# Patient Record
Sex: FEMALE | Race: WHITE | NOT HISPANIC OR LATINO | Employment: OTHER | URBAN - METROPOLITAN AREA
[De-identification: names, ages, dates, MRNs, and addresses within clinical notes are randomized per-mention and may not be internally consistent; named-entity substitution may affect disease eponyms.]

---

## 2017-07-18 ENCOUNTER — HOSPITAL ENCOUNTER (OUTPATIENT)
Dept: RADIOLOGY | Facility: HOSPITAL | Age: 77
Discharge: HOME | End: 2017-07-18
Attending: ORTHOPAEDIC SURGERY
Payer: MEDICARE

## 2017-07-18 ENCOUNTER — OFFICE VISIT (OUTPATIENT)
Dept: ORTHOPEDICS | Facility: CLINIC | Age: 77
End: 2017-07-18
Payer: MEDICARE

## 2017-07-18 VITALS
BODY MASS INDEX: 24.73 KG/M2 | HEIGHT: 61 IN | HEART RATE: 50 BPM | DIASTOLIC BLOOD PRESSURE: 60 MMHG | WEIGHT: 131 LBS | SYSTOLIC BLOOD PRESSURE: 126 MMHG

## 2017-07-18 DIAGNOSIS — M25.532 LEFT WRIST PAIN: Primary | ICD-10-CM

## 2017-07-18 DIAGNOSIS — S52.502A CLOSED FRACTURE OF DISTAL END OF LEFT RADIUS, UNSPECIFIED FRACTURE MORPHOLOGY, INITIAL ENCOUNTER: Primary | ICD-10-CM

## 2017-07-18 DIAGNOSIS — M25.532 LEFT WRIST PAIN: ICD-10-CM

## 2017-07-18 PROCEDURE — 99999 PR PBB SHADOW E&M-NEW PATIENT-LVL III: CPT | Mod: PBBFAC,,, | Performed by: ORTHOPAEDIC SURGERY

## 2017-07-18 PROCEDURE — 1125F AMNT PAIN NOTED PAIN PRSNT: CPT | Mod: ,,, | Performed by: ORTHOPAEDIC SURGERY

## 2017-07-18 PROCEDURE — 73110 X-RAY EXAM OF WRIST: CPT | Mod: TC,PN,LT

## 2017-07-18 PROCEDURE — 1159F MED LIST DOCD IN RCRD: CPT | Mod: ,,, | Performed by: ORTHOPAEDIC SURGERY

## 2017-07-18 PROCEDURE — 73110 X-RAY EXAM OF WRIST: CPT | Mod: 26,LT,, | Performed by: RADIOLOGY

## 2017-07-18 PROCEDURE — 99203 OFFICE O/P NEW LOW 30 MIN: CPT | Mod: S$PBB,,, | Performed by: ORTHOPAEDIC SURGERY

## 2017-07-18 RX ORDER — NICOTINE POLACRILEX 2 MG
10000 GUM BUCCAL
COMMUNITY

## 2017-07-18 RX ORDER — CHOLECALCIFEROL (VITAMIN D3) 25 MCG
5000 TABLET ORAL
COMMUNITY

## 2017-07-18 RX ORDER — HYDROCODONE BITARTRATE AND ACETAMINOPHEN 10; 325 MG/1; MG/1
TABLET ORAL
Status: ON HOLD | COMMUNITY
End: 2023-09-04 | Stop reason: HOSPADM

## 2017-07-18 RX ORDER — PSEUDOEPHEDRINE HCL 30 MG
30 TABLET ORAL EVERY 4 HOURS PRN
Status: ON HOLD | COMMUNITY
End: 2023-09-04 | Stop reason: HOSPADM

## 2017-07-18 RX ORDER — OXYBUTYNIN CHLORIDE 5 MG/1
5 TABLET ORAL
COMMUNITY

## 2017-07-18 RX ORDER — VENLAFAXINE HYDROCHLORIDE 75 MG/1
75 CAPSULE, EXTENDED RELEASE ORAL DAILY
COMMUNITY

## 2017-07-18 RX ORDER — IBUPROFEN 200 MG
1500 CAPSULE ORAL
Status: ON HOLD | COMMUNITY
End: 2023-09-04 | Stop reason: HOSPADM

## 2017-07-18 RX ORDER — TRAMADOL HYDROCHLORIDE 50 MG/1
50 TABLET ORAL EVERY 6 HOURS PRN
COMMUNITY

## 2017-07-18 NOTE — PROGRESS NOTES
History reviewed. No pertinent past medical history.    History reviewed. No pertinent surgical history.    Current Outpatient Prescriptions   Medication Sig    biotin 1 mg Cap Take 10,000 mcg by mouth.    calcium carbonate-vitamin D3 500 mg(1,250mg) -125 unit per tablet Take 1,500 mg by mouth.    hydrocodone-acetaminophen 10-325mg (NORCO)  mg Tab Take by mouth.    oxybutynin (DITROPAN) 5 MG Tab Take 5 mg by mouth.    pseudoephedrine (SUDAFED) 30 MG tablet Take 30 mg by mouth every 4 (four) hours as needed for Congestion.    tramadol (ULTRAM) 50 mg tablet Take 50 mg by mouth every 6 (six) hours as needed for Pain.    venlafaxine (EFFEXOR-XR) 75 MG 24 hr capsule Take 75 mg by mouth once daily.    vitamin D 1000 units Tab Take 5,000 Units by mouth.     No current facility-administered medications for this visit.        Review of patient's allergies indicates:   Allergen Reactions    Metrizamide Hives    Morphine Other (See Comments)     Took two days to recover    Oxycodone-acetaminophen Other (See Comments)     CONSTIPATION       History reviewed. No pertinent family history.    Social History     Social History    Marital status:      Spouse name: N/A    Number of children: N/A    Years of education: N/A     Occupational History    Not on file.     Social History Main Topics    Smoking status: Never Smoker    Smokeless tobacco: Never Used    Alcohol use No    Drug use: No    Sexual activity: Not on file     Other Topics Concern    Not on file     Social History Narrative    No narrative on file       Chief Complaint:   Chief Complaint   Patient presents with    Left Wrist - Injury       Consulting Physician: Self, Aaareferral    History of present illness:    This is a 76 y.o. year old female who complains of a first pain following a fall on 7/14/17.  She was seen in the emergency room where she was splinted.  She puts her pain today at a 6 out of 10.  She is  "right-handed.    Review of Systems:    Constitution: Denies chills, fever, and sweats.  HENT: Denies headaches or blurry vision.  Cardiovascular: Denies chest pain or irregular heart beat.  Respiratory: Denies cough or shortness of breath.  Gastrointestinal: Denies abdominal pain, nausea, or vomiting.  Musculoskeletal:  Denies muscle cramps.  Neurological: Denies dizziness or focal weakness.  Psychiatric/Behavioral: Normal mental status.  Hematologic/Lymphatic: Denies bleeding problem or easy bruising/bleeding.  Skin: Denies rash or suspicious lesions.    Examination:    Vital Signs:    Vitals:    07/18/17 0947   BP: 126/60   Pulse: (!) 50   Weight: 59.4 kg (131 lb)   Height: 5' 1" (1.549 m)   PainSc:   6   PainLoc: Wrist       Body mass index is 24.75 kg/m².    This a well-developed, well nourished patient in no acute distress.    Alert and oriented and cooperative to examination.       Physical Exam: Left Wrist Exam    Skin  Scars:   None  Rash:   None    Inspection  Erythema:  None  Bruising:  Diffuse  Swelling:  Moderate  Masses  None  Lymphadenopathy: None    Coordination:  Normal  Instability:  Not tested due to fracture    Range of Motion: Not tested due to fracture    Strength:  Not tested due to fracture    Tenderness:  Diffuse    Pulse:   2+ radial  Sensation:  Intact          Imaging: X-rays ordered and reviewed today of the left foot show a minimally displaced left distal radius fracture with acceptable alignment.        Assessment: Closed fracture of distal end of left radius, unspecified fracture morphology, initial encounter        Plan:  We'll place her into a short arm cast today.  I like to see him back in 1 week for in cast x-ray check.      DISCLAIMER: This note may have been dictated using voice recognition software and may contain grammatical errors.     NOTE: Consult report sent to referring provider via EPIC EMR.  "

## 2017-07-24 DIAGNOSIS — S52.502A CLOSED FRACTURE OF DISTAL END OF LEFT RADIUS, UNSPECIFIED FRACTURE MORPHOLOGY, INITIAL ENCOUNTER: Primary | ICD-10-CM

## 2017-07-25 ENCOUNTER — OFFICE VISIT (OUTPATIENT)
Dept: ORTHOPEDICS | Facility: CLINIC | Age: 77
End: 2017-07-25
Payer: MEDICARE

## 2017-07-25 ENCOUNTER — HOSPITAL ENCOUNTER (OUTPATIENT)
Dept: RADIOLOGY | Facility: HOSPITAL | Age: 77
Discharge: HOME | End: 2017-07-25
Attending: ORTHOPAEDIC SURGERY
Payer: MEDICARE

## 2017-07-25 VITALS
HEART RATE: 68 BPM | HEIGHT: 61 IN | SYSTOLIC BLOOD PRESSURE: 113 MMHG | WEIGHT: 130.94 LBS | BODY MASS INDEX: 24.72 KG/M2 | DIASTOLIC BLOOD PRESSURE: 56 MMHG

## 2017-07-25 DIAGNOSIS — S52.502D CLOSED FRACTURE OF DISTAL END OF LEFT RADIUS WITH ROUTINE HEALING, UNSPECIFIED FRACTURE MORPHOLOGY, SUBSEQUENT ENCOUNTER: Primary | ICD-10-CM

## 2017-07-25 DIAGNOSIS — S52.502A CLOSED FRACTURE OF DISTAL END OF LEFT RADIUS, UNSPECIFIED FRACTURE MORPHOLOGY, INITIAL ENCOUNTER: ICD-10-CM

## 2017-07-25 PROCEDURE — 73110 X-RAY EXAM OF WRIST: CPT | Mod: 26,LT,, | Performed by: RADIOLOGY

## 2017-07-25 PROCEDURE — 99213 OFFICE O/P EST LOW 20 MIN: CPT | Mod: S$PBB,,, | Performed by: ORTHOPAEDIC SURGERY

## 2017-07-25 PROCEDURE — 1159F MED LIST DOCD IN RCRD: CPT | Mod: ,,, | Performed by: ORTHOPAEDIC SURGERY

## 2017-07-25 PROCEDURE — 1125F AMNT PAIN NOTED PAIN PRSNT: CPT | Mod: ,,, | Performed by: ORTHOPAEDIC SURGERY

## 2017-07-25 PROCEDURE — 99999 PR PBB SHADOW E&M-EST. PATIENT-LVL III: CPT | Mod: PBBFAC,,, | Performed by: ORTHOPAEDIC SURGERY

## 2017-07-25 PROCEDURE — 73110 X-RAY EXAM OF WRIST: CPT | Mod: TC,PN,LT

## 2017-07-25 NOTE — PROGRESS NOTES
History reviewed. No pertinent past medical history.    History reviewed. No pertinent surgical history.    Current Outpatient Prescriptions   Medication Sig    biotin 1 mg Cap Take 10,000 mcg by mouth.    calcium carbonate-vitamin D3 500 mg(1,250mg) -125 unit per tablet Take 1,500 mg by mouth.    hydrocodone-acetaminophen 10-325mg (NORCO)  mg Tab Take by mouth.    oxybutynin (DITROPAN) 5 MG Tab Take 5 mg by mouth.    pseudoephedrine (SUDAFED) 30 MG tablet Take 30 mg by mouth every 4 (four) hours as needed for Congestion.    tramadol (ULTRAM) 50 mg tablet Take 50 mg by mouth every 6 (six) hours as needed for Pain.    venlafaxine (EFFEXOR-XR) 75 MG 24 hr capsule Take 75 mg by mouth once daily.    vitamin D 1000 units Tab Take 5,000 Units by mouth.     No current facility-administered medications for this visit.        Review of patient's allergies indicates:   Allergen Reactions    Metrizamide Hives    Morphine Other (See Comments)     Took two days to recover    Oxycodone-acetaminophen Other (See Comments)     CONSTIPATION       History reviewed. No pertinent family history.    Social History     Social History    Marital status:      Spouse name: N/A    Number of children: N/A    Years of education: N/A     Occupational History    Not on file.     Social History Main Topics    Smoking status: Never Smoker    Smokeless tobacco: Never Used    Alcohol use No    Drug use: No    Sexual activity: Not on file     Other Topics Concern    Not on file     Social History Narrative    No narrative on file       Chief Complaint:   Chief Complaint   Patient presents with    Left Wrist - Injury       Consulting Physician: No ref. provider found    History of present illness:    This is a 76 y.o. year old female who complains of wrist pain following a fall on 7/14/17. She puts her pain today at a 2 out of 10.    Review of Systems:    Constitution: Denies chills, fever, and sweats.  HENT: Denies  "headaches or blurry vision.  Cardiovascular: Denies chest pain or irregular heart beat.  Respiratory: Denies cough or shortness of breath.  Gastrointestinal: Denies abdominal pain, nausea, or vomiting.  Musculoskeletal:  Denies muscle cramps.  Neurological: Denies dizziness or focal weakness.  Psychiatric/Behavioral: Normal mental status.  Hematologic/Lymphatic: Denies bleeding problem or easy bruising/bleeding.  Skin: Denies rash or suspicious lesions.    Examination:    Vital Signs:    Vitals:    07/25/17 1042   BP: (!) 113/56   Pulse: 68   Weight: 59.4 kg (130 lb 15.3 oz)   Height: 5' 1" (1.549 m)   PainSc:   2   PainLoc: Wrist       Body mass index is 24.74 kg/m².    This a well-developed, well nourished patient in no acute distress.    Alert and oriented and cooperative to examination.       Physical Exam: Left Wrist Exam in cast    Skin  Scars:   None  Rash:   None    Inspection  Erythema:  None  Bruising:  none  Swelling:  minimal  Masses  None  Lymphadenopathy: None    Coordination:  Normal  Instability:  Not tested due to fracture    Range of Motion: Not tested due to fracture    Strength:  Not tested due to fracture    Tenderness:  Diffuse    Pulse:   2+ radial  Sensation:  Intact          Imaging: X-rays ordered and reviewed today of the left foot show a minimally displaced left distal radius fracture with acceptable alignment.        Assessment: Closed fracture of distal end of left radius with routine healing, unspecified fracture morphology, subsequent encounter        Plan:  We'll continue her short arm cast today.  I like to see her back in 4 weeks for out of cast x-ray check.      DISCLAIMER: This note may have been dictated using voice recognition software and may contain grammatical errors.     NOTE: Consult report sent to referring provider via EPIC EMR.  "

## 2017-08-17 DIAGNOSIS — S52.502D CLOSED FRACTURE OF DISTAL END OF LEFT RADIUS WITH ROUTINE HEALING, UNSPECIFIED FRACTURE MORPHOLOGY, SUBSEQUENT ENCOUNTER: Primary | ICD-10-CM

## 2017-08-21 ENCOUNTER — OFFICE VISIT (OUTPATIENT)
Dept: ORTHOPEDICS | Facility: CLINIC | Age: 77
End: 2017-08-21
Payer: MEDICARE

## 2017-08-21 VITALS
SYSTOLIC BLOOD PRESSURE: 118 MMHG | HEIGHT: 61 IN | HEART RATE: 67 BPM | DIASTOLIC BLOOD PRESSURE: 70 MMHG | BODY MASS INDEX: 24.72 KG/M2 | WEIGHT: 130.94 LBS

## 2017-08-21 DIAGNOSIS — S52.502D CLOSED FRACTURE OF DISTAL END OF LEFT RADIUS WITH ROUTINE HEALING, UNSPECIFIED FRACTURE MORPHOLOGY, SUBSEQUENT ENCOUNTER: Primary | ICD-10-CM

## 2017-08-21 PROCEDURE — 1159F MED LIST DOCD IN RCRD: CPT | Mod: S$GLB,,, | Performed by: ORTHOPAEDIC SURGERY

## 2017-08-21 PROCEDURE — 99213 OFFICE O/P EST LOW 20 MIN: CPT | Mod: S$GLB,,, | Performed by: ORTHOPAEDIC SURGERY

## 2017-08-21 PROCEDURE — 1125F AMNT PAIN NOTED PAIN PRSNT: CPT | Mod: S$GLB,,, | Performed by: ORTHOPAEDIC SURGERY

## 2017-08-21 NOTE — PROGRESS NOTES
History reviewed. No pertinent past medical history.    History reviewed. No pertinent surgical history.    Current Outpatient Prescriptions   Medication Sig    biotin 1 mg Cap Take 10,000 mcg by mouth.    calcium carbonate-vitamin D3 500 mg(1,250mg) -125 unit per tablet Take 1,500 mg by mouth.    hydrocodone-acetaminophen 10-325mg (NORCO)  mg Tab Take by mouth.    oxybutynin (DITROPAN) 5 MG Tab Take 5 mg by mouth.    pseudoephedrine (SUDAFED) 30 MG tablet Take 30 mg by mouth every 4 (four) hours as needed for Congestion.    tramadol (ULTRAM) 50 mg tablet Take 50 mg by mouth every 6 (six) hours as needed for Pain.    venlafaxine (EFFEXOR-XR) 75 MG 24 hr capsule Take 75 mg by mouth once daily.    vitamin D 1000 units Tab Take 5,000 Units by mouth.     No current facility-administered medications for this visit.        Review of patient's allergies indicates:   Allergen Reactions    Metrizamide Hives    Morphine Other (See Comments)     Took two days to recover    Oxycodone-acetaminophen Other (See Comments)     CONSTIPATION       History reviewed. No pertinent family history.    Social History     Social History    Marital status:      Spouse name: N/A    Number of children: N/A    Years of education: N/A     Occupational History    Not on file.     Social History Main Topics    Smoking status: Never Smoker    Smokeless tobacco: Never Used    Alcohol use No    Drug use: No    Sexual activity: Not on file     Other Topics Concern    Not on file     Social History Narrative    No narrative on file       Chief Complaint:   Chief Complaint   Patient presents with    Wrist Injury     closed fx of distal end of LEFT radius 7/14/17       Consulting Physician: No ref. provider found    History of present illness:    This is a 76 y.o. year old female who complains of wrist pain following a fall on 7/14/17. She puts her pain today at a 1 out of 10.    Review of Systems:    Constitution:  "Denies chills, fever, and sweats.  HENT: Denies headaches or blurry vision.  Cardiovascular: Denies chest pain or irregular heart beat.  Respiratory: Denies cough or shortness of breath.  Gastrointestinal: Denies abdominal pain, nausea, or vomiting.  Musculoskeletal:  Denies muscle cramps.  Neurological: Denies dizziness or focal weakness.  Psychiatric/Behavioral: Normal mental status.  Hematologic/Lymphatic: Denies bleeding problem or easy bruising/bleeding.  Skin: Denies rash or suspicious lesions.    Examination:    Vital Signs:    Vitals:    08/21/17 1034   BP: 118/70   Pulse: 67   Weight: 59.4 kg (130 lb 15.3 oz)   Height: 5' 1" (1.549 m)   PainSc:   1   PainLoc: Wrist       Body mass index is 24.74 kg/m².    This a well-developed, well nourished patient in no acute distress.    Alert and oriented and cooperative to examination.       Physical Exam: Left Wrist Exam in cast    Skin  Scars:   None  Rash:   None    Inspection  Erythema:  None  Bruising:  none  Swelling:  none  Masses  None  Lymphadenopathy: None    Coordination:  Normal  Instability:  Not tested due to fracture    Range of Motion: Near normal    Strength:  Not tested due to fracture    Tenderness:  none    Pulse:   2+ radial  Sensation:  Intact          Imaging: X-rays ordered and reviewed today of the left foot show a minimally displaced left distal radius fracture with acceptable alignment.        Assessment: Closed fracture of distal end of left radius with routine healing, unspecified fracture morphology, subsequent encounter        Plan: Switch her to a wrist immobilizer today.  We'll also get her set up for some physical therapy.  We gave her a home exercise program.  We'll see her back in 6 weeks' time.    DISCLAIMER: This note may have been dictated using voice recognition software and may contain grammatical errors.     NOTE: Consult report sent to referring provider via EPIC EMR.  "

## 2017-10-02 ENCOUNTER — OFFICE VISIT (OUTPATIENT)
Dept: ORTHOPEDICS | Facility: CLINIC | Age: 77
End: 2017-10-02
Payer: MEDICARE

## 2017-10-02 VITALS
SYSTOLIC BLOOD PRESSURE: 137 MMHG | BODY MASS INDEX: 24.72 KG/M2 | HEIGHT: 61 IN | DIASTOLIC BLOOD PRESSURE: 73 MMHG | HEART RATE: 60 BPM | WEIGHT: 130.94 LBS

## 2017-10-02 DIAGNOSIS — S52.502D CLOSED FRACTURE OF DISTAL END OF LEFT RADIUS WITH ROUTINE HEALING, UNSPECIFIED FRACTURE MORPHOLOGY, SUBSEQUENT ENCOUNTER: Primary | ICD-10-CM

## 2017-10-02 PROCEDURE — 99213 OFFICE O/P EST LOW 20 MIN: CPT | Mod: S$GLB,,, | Performed by: ORTHOPAEDIC SURGERY

## 2021-03-30 ENCOUNTER — TELEPHONE (OUTPATIENT)
Dept: OBSTETRICS AND GYNECOLOGY | Facility: CLINIC | Age: 81
End: 2021-03-30

## 2021-03-30 RX ORDER — OSPEMIFENE 60 MG/1
TABLET, FILM COATED ORAL
Qty: 90 TABLET | Refills: 0 | Status: SHIPPED | OUTPATIENT
Start: 2021-03-30 | End: 2021-06-02

## 2022-03-16 DIAGNOSIS — Z12.31 ENCOUNTER FOR SCREENING MAMMOGRAM FOR BREAST CANCER: Primary | ICD-10-CM

## 2022-03-18 ENCOUNTER — TELEPHONE (OUTPATIENT)
Dept: OBSTETRICS AND GYNECOLOGY | Facility: CLINIC | Age: 82
End: 2022-03-18
Payer: MEDICARE

## 2022-03-18 RX ORDER — FLUCONAZOLE 150 MG/1
TABLET ORAL
Qty: 2 TABLET | Refills: 0 | Status: ON HOLD | OUTPATIENT
Start: 2022-03-18 | End: 2023-09-04 | Stop reason: HOSPADM

## 2022-03-18 NOTE — TELEPHONE ENCOUNTER
----- Message from Rocio Metcalf LPN sent at 3/18/2022 11:52 AM CDT -----  Regarding: FW: fill today please called days ago  Contact: pt    ----- Message -----  From: Ameena Moreno  Sent: 3/18/2022  11:27 AM CDT  To: Beatrice Rajput I Staff  Subject: fill today please called days ago                Type:  RX Refill Request    Who Called:  the patient  Refill or New Rx:  refill  RX Name and Strength:  yeast infection (2 pills she said)  How is the patient currently taking it? (ex. 1XDay):  as order  Is this a 30 day or 90 day RX:  as order  Preferred Pharmacy with phone number:    [facility] - [place], [place] - [address]  [address]  [place] [address]  Phone: [phone] Fax: [phone]      Local or Mail Order:  local  Ordering Provider:  beatrice Mayberry Call Back Number:  [phone]      Additional Information:  pt called in a few days ago and sent to Dr. Lima, was not filled can we please get this filled today please, Thanks Delma

## 2022-04-14 ENCOUNTER — OFFICE VISIT (OUTPATIENT)
Dept: OBSTETRICS AND GYNECOLOGY | Facility: CLINIC | Age: 82
End: 2022-04-14
Payer: MEDICARE

## 2022-04-14 ENCOUNTER — HOSPITAL ENCOUNTER (OUTPATIENT)
Dept: RADIOLOGY | Facility: CLINIC | Age: 82
Discharge: HOME | End: 2022-04-14
Payer: MEDICARE

## 2022-04-14 VITALS
HEIGHT: 59 IN | SYSTOLIC BLOOD PRESSURE: 136 MMHG | WEIGHT: 146 LBS | BODY MASS INDEX: 29.43 KG/M2 | DIASTOLIC BLOOD PRESSURE: 70 MMHG

## 2022-04-14 DIAGNOSIS — R82.998 DARK URINE: Primary | ICD-10-CM

## 2022-04-14 DIAGNOSIS — Z12.31 ENCOUNTER FOR SCREENING MAMMOGRAM FOR BREAST CANCER: ICD-10-CM

## 2022-04-14 PROCEDURE — G0101 CA SCREEN;PELVIC/BREAST EXAM: HCPCS | Mod: S$GLB,,, | Performed by: OBSTETRICS & GYNECOLOGY

## 2022-04-14 PROCEDURE — 87086 URINE CULTURE/COLONY COUNT: CPT | Performed by: OBSTETRICS & GYNECOLOGY

## 2022-04-14 PROCEDURE — 77063 MAMMO DIGITAL SCREENING BILAT WITH TOMO: ICD-10-PCS | Mod: S$GLB,,, | Performed by: RADIOLOGY

## 2022-04-14 PROCEDURE — 77067 MAMMO DIGITAL SCREENING BILAT WITH TOMO: ICD-10-PCS | Mod: S$GLB,,, | Performed by: RADIOLOGY

## 2022-04-14 PROCEDURE — G0101 PR CA SCREEN;PELVIC/BREAST EXAM: ICD-10-PCS | Mod: S$GLB,,, | Performed by: OBSTETRICS & GYNECOLOGY

## 2022-04-14 PROCEDURE — 77063 BREAST TOMOSYNTHESIS BI: CPT | Mod: S$GLB,,, | Performed by: RADIOLOGY

## 2022-04-14 PROCEDURE — 77067 SCR MAMMO BI INCL CAD: CPT | Mod: S$GLB,,, | Performed by: RADIOLOGY

## 2022-04-14 NOTE — PROGRESS NOTES
"CC: Well woman exam    Mariela Solano is a 81 y.o. female No obstetric history on file. presents for well woman exam.  LMP: No LMP recorded. Patient is postmenopausal..   Patients last pap was over 10 years ago. .  Last wellness labs were done at Dr. Villa .  Last mammogram was 2022.  Last colonoscopy was   Primary care is Dr. Villa.  SBE: Yes No issues, problems, or complaints.  Patient complains of some cloudy urine but no other dysuria or frequency.  We will check urinalysis today.  Otherwise she is remaining healthy and has no problems and is due for mammogram today and wellness checkup.    Chief Complaint   Patient presents with    Well Woman     Patient is here for her well woman visit.         Past Medical History:   Diagnosis Date    Liver disease     Osteoarthritis      Past Surgical History:   Procedure Laterality Date    LUMBAR FUSION      TOTAL KNEE ARTHROPLASTY Bilateral     TUBAL LIGATION       Social History     Socioeconomic History    Marital status:    Tobacco Use    Smoking status: Never Smoker    Smokeless tobacco: Never Used   Substance and Sexual Activity    Alcohol use: No    Drug use: No    Sexual activity: Never     Birth control/protection: None     History reviewed. No pertinent family history.  OB History             Para        Term                AB        Living   3       SAB        IAB        Ectopic        Multiple        Live Births   3                 /70   Ht 4' 11" (1.499 m)   Wt 66.2 kg (146 lb)   BMI 29.49 kg/m²       ROS:  GENERAL: Denies weight gain or weight loss. Feeling well overall.   SKIN: Denies rash or lesions.   HEAD: Denies head injury or headache.   NODES: Denies enlarged lymph nodes.   CHEST: Denies chest pain or shortness of breath.   CARDIOVASCULAR: Denies palpitations or left sided chest pain.   ABDOMEN: No abdominal pain, constipation, diarrhea, nausea, vomiting or rectal bleeding.   URINARY: No " frequency, dysuria, hematuria, or burning on urination.  REPRODUCTIVE: See HPI.   BREASTS: The patient performs breast self-examination and denies pain, lumps, or nipple discharge.   HEMATOLOGIC: No easy bruisability or excessive bleeding.   MUSCULOSKELETAL: Denies joint pain or swelling.   NEUROLOGIC: Denies syncope or weakness.   PSYCHIATRIC: Denies depression, anxiety or mood swings.    PHYSICAL EXAM:  APPEARANCE: Well nourished, well developed, in no acute distress.  AFFECT: WNL, alert and oriented x 3  SKIN: No acne or hirsutism  NECK: Neck symmetric without masses or thyromegaly  NODES: No inguinal, cervical, or axillary lymph node enlargement  CHEST: Good respiratory effect  ABDOMEN: Soft.  No tenderness or masses.  No hepatosplenomegaly.  No hernias.  BREASTS: Symmetrical, no skin changes or visible lesions.  No palpable masses, nipple discharge bilaterally.  PELVIC:  Deferred   EXTREMITIES: No edema.    There are no diagnoses linked to this encounter.  Impression:  Normal wellness exam and breast screening.  Plan:  Yearly wellness and mammogram.      Patient was counseled today on A.C.S. Pap guidelines and recommendations for yearly pelvic exams, mammograms and monthly self breast exams; to see her PCP for other health maintenance.     No follow-ups on file.

## 2022-04-16 LAB — BACTERIA UR CULT: NO GROWTH

## 2022-05-16 ENCOUNTER — TELEPHONE (OUTPATIENT)
Dept: OBSTETRICS AND GYNECOLOGY | Facility: CLINIC | Age: 82
End: 2022-05-16
Payer: MEDICARE

## 2022-05-16 NOTE — TELEPHONE ENCOUNTER
Patient returned call let patient know that her result were normal and that she should receive a letter. Patient verbalized that she understood.

## 2023-01-11 ENCOUNTER — TELEPHONE (OUTPATIENT)
Dept: OBSTETRICS AND GYNECOLOGY | Facility: CLINIC | Age: 83
End: 2023-01-11
Payer: MEDICARE

## 2023-01-11 DIAGNOSIS — M81.8 OTHER OSTEOPOROSIS WITHOUT CURRENT PATHOLOGICAL FRACTURE: ICD-10-CM

## 2023-01-11 DIAGNOSIS — Z13.820 ENCOUNTER FOR SCREENING FOR OSTEOPOROSIS: Primary | ICD-10-CM

## 2023-01-11 NOTE — TELEPHONE ENCOUNTER
----- Message from Rocio Metcalf LPN sent at 1/11/2023 12:06 PM CST -----  Contact: PT  DEXA order      ----- Message -----  From: Ameena Moreno  Sent: 1/11/2023  11:55 AM CST  To: Beatrice Rajput I Staff    Pt is requesting orders to be put in/ SEND FAX    Orders Requested: BONE DENSITY  Reason for the orders: ANNUAL   Additional Information:     Please call pt to inform them the orders have been entered/SEND FAX so that they are able to call and schedule.     Call Back #: [phone]  Fax #:    Name of Company being Faxed: [facility]    PHONE [phone]  Thanks

## 2023-04-17 ENCOUNTER — TELEPHONE (OUTPATIENT)
Dept: OBSTETRICS AND GYNECOLOGY | Facility: CLINIC | Age: 83
End: 2023-04-17
Payer: MEDICARE

## 2023-04-17 DIAGNOSIS — Z12.31 ENCOUNTER FOR SCREENING MAMMOGRAM FOR BREAST CANCER: Primary | ICD-10-CM

## 2023-04-17 NOTE — TELEPHONE ENCOUNTER
----- Message from Angella Tolliver MA sent at 4/13/2023  3:25 PM CDT -----  Regarding: FW: mammo  Contact: patient  Requesting mammo  ----- Message -----  From: Mónica Santillan  Sent: 4/13/2023  12:30 PM CDT  To: Beatrice MOORE Staff  Subject: mammo                                            Type:  Mammogram    Caller is requesting to schedule their annual mammogram appointment.  Order is not listed in EPIC.  Please enter order and contact patient to schedule.    Name of Caller:  patient  Where would they like the mammogram performed?  Dr. Barron's Office  Best Call Back Number:  [phone] (home)     Additional Information: Patient is requesting an order for a mammogram patient would also like her bone density scheduled at Dr. Barron's office.  Please Call patient to advise.  Thanks!

## 2023-04-17 NOTE — TELEPHONE ENCOUNTER
Order was placed to be scheduled at [facility].  She can try to schedule around same time as BDS that is scheduled.

## 2023-04-20 ENCOUNTER — HOSPITAL ENCOUNTER (OUTPATIENT)
Dept: RADIOLOGY | Facility: HOSPITAL | Age: 83
Discharge: HOME | End: 2023-04-20
Attending: NURSE PRACTITIONER
Payer: MEDICARE

## 2023-04-20 DIAGNOSIS — M81.8 OTHER OSTEOPOROSIS WITHOUT CURRENT PATHOLOGICAL FRACTURE: ICD-10-CM

## 2023-04-20 DIAGNOSIS — Z13.820 ENCOUNTER FOR SCREENING FOR OSTEOPOROSIS: ICD-10-CM

## 2023-04-20 PROCEDURE — 77080 DXA BONE DENSITY AXIAL: CPT | Mod: TC

## 2023-04-20 PROCEDURE — 77080 DXA BONE DENSITY AXIAL: CPT | Mod: 26,,, | Performed by: RADIOLOGY

## 2023-04-20 PROCEDURE — 77080 DEXA BONE DENSITY SPINE HIP: ICD-10-PCS | Mod: 26,,, | Performed by: RADIOLOGY

## 2023-04-21 ENCOUNTER — TELEPHONE (OUTPATIENT)
Dept: OBSTETRICS AND GYNECOLOGY | Facility: CLINIC | Age: 83
End: 2023-04-21
Payer: MEDICARE

## 2023-04-21 NOTE — TELEPHONE ENCOUNTER
----- Message from Crys Barron NP sent at 4/21/2023  1:26 PM CDT -----  Worsening T score in hip.  Increase in fracture risk on FRAX score also.  I would recommend f/u with primary care for other treatment options or referral to osteoporosis clinic at [facility].

## 2023-04-21 NOTE — TELEPHONE ENCOUNTER
Spoke with pt and gave her, her results. Pt stated that she would like to follow up with her PCP. She stated that she is not in any pain and that she will follow up with her PCP in about 2months. Pt understood verbally understanding.

## 2023-05-04 ENCOUNTER — TELEPHONE (OUTPATIENT)
Dept: OBSTETRICS AND GYNECOLOGY | Facility: CLINIC | Age: 83
End: 2023-05-04
Payer: MEDICARE

## 2023-05-04 DIAGNOSIS — Z12.31 ENCOUNTER FOR SCREENING MAMMOGRAM FOR BREAST CANCER: Primary | ICD-10-CM

## 2023-05-04 NOTE — TELEPHONE ENCOUNTER
----- Message from Maddy Raman MA sent at 5/3/2023  4:22 PM CDT -----  Contact: Patient    ----- Message -----  From: Maddy Olsen  Sent: 5/3/2023   4:10 PM CDT  To: Beatrice MOORE Staff    Type:  Needs Medical Advice    Who Called: Patient       Would the patient rather a call back or a response via [facility]? Call    Best Call Back Number: [phone] ([place])     Additional Information: Patient is requesting orders for a mammo be sent to [facility]. Please call to advise

## 2023-07-05 ENCOUNTER — HOSPITAL ENCOUNTER (OUTPATIENT)
Dept: RADIOLOGY | Facility: HOSPITAL | Age: 83
Discharge: HOME | End: 2023-07-05
Attending: NURSE PRACTITIONER
Payer: MEDICARE

## 2023-07-05 DIAGNOSIS — Z12.31 ENCOUNTER FOR SCREENING MAMMOGRAM FOR BREAST CANCER: ICD-10-CM

## 2023-07-05 PROCEDURE — 77063 MAMMO DIGITAL SCREENING BILAT WITH TOMO: ICD-10-PCS | Mod: 26,,, | Performed by: RADIOLOGY

## 2023-07-05 PROCEDURE — 77063 BREAST TOMOSYNTHESIS BI: CPT | Mod: 26,,, | Performed by: RADIOLOGY

## 2023-07-05 PROCEDURE — 77067 SCR MAMMO BI INCL CAD: CPT | Mod: TC

## 2023-07-05 PROCEDURE — 77067 SCR MAMMO BI INCL CAD: CPT | Mod: 26,,, | Performed by: RADIOLOGY

## 2023-07-05 PROCEDURE — 77067 MAMMO DIGITAL SCREENING BILAT WITH TOMO: ICD-10-PCS | Mod: 26,,, | Performed by: RADIOLOGY

## 2023-08-28 ENCOUNTER — TELEPHONE (OUTPATIENT)
Dept: OBSTETRICS AND GYNECOLOGY | Facility: CLINIC | Age: 83
End: 2023-08-28
Payer: MEDICARE

## 2023-08-28 DIAGNOSIS — N95.2 ATROPHIC VAGINITIS: Primary | ICD-10-CM

## 2023-08-28 RX ORDER — OSPEMIFENE 60 MG/1
1 TABLET, FILM COATED ORAL DAILY
Qty: 90 TABLET | Refills: 3 | Status: SHIPPED | OUTPATIENT
Start: 2023-08-28 | End: 2023-12-12

## 2023-08-28 NOTE — TELEPHONE ENCOUNTER
----- Message from Mohamud Evans MA sent at 8/28/2023  2:52 PM CDT -----  Contact: pt    ----- Message -----  From: Deonte Frias  Sent: 8/28/2023  11:04 AM CDT  To: Beatrice Rajput I Staff    Type:  RX Refill Request    Who Called:  pt   Refill or New Rx:  refill  RX Name and Strength:  OSPHENA 60 mg Tab  How is the patient currently taking it? (ex. 1XDay):    Is this a 30 day or 90 day RX:  90  Preferred Pharmacy with phone number:        [facility] - [address] [address]  Phone: [phone] Fax: [phone]      Local or Mail Order:  mail  Ordering Provider:  DOM Barron  Best Call Back Number:  [phone]    Additional Information:  pt would like a refill. Please advise.

## 2023-08-30 ENCOUNTER — HOSPITAL ENCOUNTER (INPATIENT)
Facility: HOSPITAL | Age: 83
LOS: 4 days | Discharge: HOME | DRG: 605 | End: 2023-09-04
Attending: EMERGENCY MEDICINE | Admitting: FAMILY MEDICINE
Payer: MEDICARE

## 2023-08-30 DIAGNOSIS — S61.459A CAT BITE OF HAND: ICD-10-CM
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
## 2023-08-30 DIAGNOSIS — L03.113 CELLULITIS OF RIGHT HAND: Primary | ICD-10-CM

## 2023-08-30 DIAGNOSIS — R52 PAIN: ICD-10-CM

## 2023-08-30 DIAGNOSIS — L03.114 CELLULITIS OF HAND WITHOUT FINGER OR THUMB, LEFT: ICD-10-CM
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
## 2023-08-30 DIAGNOSIS — R07.9 CHEST PAIN: ICD-10-CM

## 2023-08-30 LAB
ALBUMIN SERPL BCP-MCNC: 3.3 G/DL (ref 3.5–5.2)
ALP SERPL-CCNC: 127 U/L (ref 55–135)
ALT SERPL W/O P-5'-P-CCNC: 14 U/L (ref 10–44)
ANION GAP SERPL CALC-SCNC: 11 MMOL/L (ref 8–16)
AST SERPL-CCNC: 23 U/L (ref 10–40)
BASOPHILS # BLD AUTO: 0.02 K/UL (ref 0–0.2)
BASOPHILS NFR BLD: 0.2 % (ref 0–1.9)
BILIRUB SERPL-MCNC: 1.1 MG/DL (ref 0.1–1)
BUN SERPL-MCNC: 26 MG/DL (ref 8–23)
CALCIUM SERPL-MCNC: 8.8 MG/DL (ref 8.7–10.5)
CHLORIDE SERPL-SCNC: 101 MMOL/L (ref 95–110)
CO2 SERPL-SCNC: 23 MMOL/L (ref 23–29)
CREAT SERPL-MCNC: 1 MG/DL (ref 0.5–1.4)
CRP SERPL-MCNC: 27.5 MG/L (ref 0–8.2)
DIFFERENTIAL METHOD: ABNORMAL
EOSINOPHIL # BLD AUTO: 0 K/UL (ref 0–0.5)
EOSINOPHIL NFR BLD: 0.4 % (ref 0–8)
ERYTHROCYTE [DISTWIDTH] IN BLOOD BY AUTOMATED COUNT: 12.4 % (ref 11.5–14.5)
EST. GFR  (NO RACE VARIABLE): 57 ML/MIN/1.73 M^2
GLUCOSE SERPL-MCNC: 150 MG/DL (ref 70–110)
HCT VFR BLD AUTO: 34.4 % (ref 37–48.5)
HGB BLD-MCNC: 11.3 G/DL (ref 12–16)
IMM GRANULOCYTES # BLD AUTO: 0.03 K/UL (ref 0–0.04)
IMM GRANULOCYTES NFR BLD AUTO: 0.3 % (ref 0–0.5)
LACTATE SERPL-SCNC: 0.8 MMOL/L (ref 0.5–2.2)
LYMPHOCYTES # BLD AUTO: 0.9 K/UL (ref 1–4.8)
LYMPHOCYTES NFR BLD: 9.4 % (ref 18–48)
MCH RBC QN AUTO: 31.2 PG (ref 27–31)
MCHC RBC AUTO-ENTMCNC: 32.8 G/DL (ref 32–36)
MCV RBC AUTO: 95 FL (ref 82–98)
MONOCYTES # BLD AUTO: 0.9 K/UL (ref 0.3–1)
MONOCYTES NFR BLD: 9.7 % (ref 4–15)
NEUTROPHILS # BLD AUTO: 7.3 K/UL (ref 1.8–7.7)
NEUTROPHILS NFR BLD: 80 % (ref 38–73)
NRBC BLD-RTO: 0 /100 WBC
PLATELET # BLD AUTO: 140 K/UL (ref 150–450)
PMV BLD AUTO: 12 FL (ref 9.2–12.9)
POTASSIUM SERPL-SCNC: 3.8 MMOL/L (ref 3.5–5.1)
PROT SERPL-MCNC: 6.5 G/DL (ref 6–8.4)
RBC # BLD AUTO: 3.62 M/UL (ref 4–5.4)
SODIUM SERPL-SCNC: 135 MMOL/L (ref 136–145)
WBC # BLD AUTO: 9.16 K/UL (ref 3.9–12.7)

## 2023-08-30 PROCEDURE — 73110 X-RAY EXAM OF WRIST: CPT | Mod: TC,RT

## 2023-08-30 PROCEDURE — 25000003 PHARM REV CODE 250: Performed by: FAMILY MEDICINE

## 2023-08-30 PROCEDURE — 25000003 PHARM REV CODE 250: Performed by: EMERGENCY MEDICINE

## 2023-08-30 PROCEDURE — 86140 C-REACTIVE PROTEIN: CPT | Performed by: EMERGENCY MEDICINE

## 2023-08-30 PROCEDURE — 90715 TDAP VACCINE 7 YRS/> IM: CPT | Performed by: EMERGENCY MEDICINE

## 2023-08-30 PROCEDURE — 96375 TX/PRO/DX INJ NEW DRUG ADDON: CPT

## 2023-08-30 PROCEDURE — 73110 X-RAY EXAM OF WRIST: CPT | Mod: 26,RT,, | Performed by: RADIOLOGY

## 2023-08-30 PROCEDURE — 63600175 PHARM REV CODE 636 W HCPCS: Performed by: FAMILY MEDICINE

## 2023-08-30 PROCEDURE — 85025 COMPLETE CBC W/AUTO DIFF WBC: CPT | Performed by: EMERGENCY MEDICINE

## 2023-08-30 PROCEDURE — 80053 COMPREHEN METABOLIC PANEL: CPT | Performed by: EMERGENCY MEDICINE

## 2023-08-30 PROCEDURE — 99223 1ST HOSP IP/OBS HIGH 75: CPT | Mod: AI,,, | Performed by: FAMILY MEDICINE

## 2023-08-30 PROCEDURE — 83605 ASSAY OF LACTIC ACID: CPT | Performed by: EMERGENCY MEDICINE

## 2023-08-30 PROCEDURE — 96365 THER/PROPH/DIAG IV INF INIT: CPT

## 2023-08-30 PROCEDURE — 73110 XR WRIST COMPLETE 3 VIEWS RIGHT: ICD-10-PCS | Mod: 26,RT,, | Performed by: RADIOLOGY

## 2023-08-30 PROCEDURE — 90471 IMMUNIZATION ADMIN: CPT | Performed by: EMERGENCY MEDICINE

## 2023-08-30 PROCEDURE — 96372 THER/PROPH/DIAG INJ SC/IM: CPT | Performed by: FAMILY MEDICINE

## 2023-08-30 PROCEDURE — 99223 PR INITIAL HOSPITAL CARE,LEVL III: ICD-10-PCS | Mod: AI,,, | Performed by: FAMILY MEDICINE

## 2023-08-30 PROCEDURE — G0378 HOSPITAL OBSERVATION PER HR: HCPCS

## 2023-08-30 PROCEDURE — 99285 EMERGENCY DEPT VISIT HI MDM: CPT

## 2023-08-30 PROCEDURE — 63600175 PHARM REV CODE 636 W HCPCS: Performed by: EMERGENCY MEDICINE

## 2023-08-30 PROCEDURE — 87040 BLOOD CULTURE FOR BACTERIA: CPT | Performed by: EMERGENCY MEDICINE

## 2023-08-30 RX ORDER — IBUPROFEN 200 MG
24 TABLET ORAL
Status: DISCONTINUED | OUTPATIENT
Start: 2023-08-30 | End: 2023-09-04 | Stop reason: HOSPADM

## 2023-08-30 RX ORDER — LANOLIN ALCOHOL/MO/W.PET/CERES
800 CREAM (GRAM) TOPICAL
Status: DISCONTINUED | OUTPATIENT
Start: 2023-08-30 | End: 2023-09-04 | Stop reason: HOSPADM

## 2023-08-30 RX ORDER — SODIUM,POTASSIUM PHOSPHATES 280-250MG
2 POWDER IN PACKET (EA) ORAL
Status: DISCONTINUED | OUTPATIENT
Start: 2023-08-30 | End: 2023-09-04 | Stop reason: HOSPADM

## 2023-08-30 RX ORDER — VENLAFAXINE HYDROCHLORIDE 37.5 MG/1
75 CAPSULE, EXTENDED RELEASE ORAL DAILY
Status: DISCONTINUED | OUTPATIENT
Start: 2023-08-30 | End: 2023-09-04 | Stop reason: HOSPADM

## 2023-08-30 RX ORDER — MELATONIN 10 MG
CAPSULE ORAL
COMMUNITY

## 2023-08-30 RX ORDER — OXYCODONE HYDROCHLORIDE 5 MG/1
5 TABLET ORAL EVERY 6 HOURS PRN
Status: DISCONTINUED | OUTPATIENT
Start: 2023-08-30 | End: 2023-09-04 | Stop reason: HOSPADM

## 2023-08-30 RX ORDER — IPRATROPIUM BROMIDE AND ALBUTEROL SULFATE 2.5; .5 MG/3ML; MG/3ML
3 SOLUTION RESPIRATORY (INHALATION) EVERY 6 HOURS PRN
Status: DISCONTINUED | OUTPATIENT
Start: 2023-08-30 | End: 2023-09-04 | Stop reason: HOSPADM

## 2023-08-30 RX ORDER — ENOXAPARIN SODIUM 100 MG/ML
40 INJECTION SUBCUTANEOUS EVERY 24 HOURS
Status: DISCONTINUED | OUTPATIENT
Start: 2023-08-30 | End: 2023-09-04 | Stop reason: HOSPADM

## 2023-08-30 RX ORDER — PROMETHAZINE HYDROCHLORIDE 12.5 MG/1
25 TABLET ORAL EVERY 6 HOURS PRN
Status: DISCONTINUED | OUTPATIENT
Start: 2023-08-30 | End: 2023-09-04 | Stop reason: HOSPADM

## 2023-08-30 RX ORDER — IBUPROFEN 200 MG
16 TABLET ORAL
Status: DISCONTINUED | OUTPATIENT
Start: 2023-08-30 | End: 2023-09-04 | Stop reason: HOSPADM

## 2023-08-30 RX ORDER — GLUCAGON 1 MG
1 KIT INJECTION
Status: DISCONTINUED | OUTPATIENT
Start: 2023-08-30 | End: 2023-09-04 | Stop reason: HOSPADM

## 2023-08-30 RX ORDER — HYDROCODONE BITARTRATE AND ACETAMINOPHEN 5; 325 MG/1; MG/1
1 TABLET ORAL
Status: COMPLETED | OUTPATIENT
Start: 2023-08-30 | End: 2023-08-30

## 2023-08-30 RX ORDER — NALOXONE HCL 0.4 MG/ML
0.02 VIAL (ML) INJECTION
Status: DISCONTINUED | OUTPATIENT
Start: 2023-08-30 | End: 2023-09-04 | Stop reason: HOSPADM

## 2023-08-30 RX ORDER — SODIUM CHLORIDE 0.9 % (FLUSH) 0.9 %
10 SYRINGE (ML) INJECTION EVERY 8 HOURS PRN
Status: DISCONTINUED | OUTPATIENT
Start: 2023-08-30 | End: 2023-09-04 | Stop reason: HOSPADM

## 2023-08-30 RX ORDER — POLYETHYLENE GLYCOL 3350 17 G/17G
17 POWDER, FOR SOLUTION ORAL DAILY PRN
Status: DISCONTINUED | OUTPATIENT
Start: 2023-08-30 | End: 2023-09-04 | Stop reason: HOSPADM

## 2023-08-30 RX ORDER — TRAMADOL HYDROCHLORIDE 50 MG/1
50 TABLET ORAL
Status: DISCONTINUED | OUTPATIENT
Start: 2023-08-30 | End: 2023-08-30

## 2023-08-30 RX ORDER — ONDANSETRON 2 MG/ML
4 INJECTION INTRAMUSCULAR; INTRAVENOUS EVERY 8 HOURS PRN
Status: DISCONTINUED | OUTPATIENT
Start: 2023-08-30 | End: 2023-09-04 | Stop reason: HOSPADM

## 2023-08-30 RX ORDER — CETIRIZINE HYDROCHLORIDE 10 MG/1
10 TABLET ORAL DAILY
COMMUNITY

## 2023-08-30 RX ORDER — ACETAMINOPHEN 325 MG/1
650 TABLET ORAL EVERY 4 HOURS PRN
Status: DISCONTINUED | OUTPATIENT
Start: 2023-08-30 | End: 2023-09-04 | Stop reason: HOSPADM

## 2023-08-30 RX ORDER — OXYCODONE AND ACETAMINOPHEN 5; 325 MG/1; MG/1
1 TABLET ORAL EVERY 4 HOURS PRN
Status: DISCONTINUED | OUTPATIENT
Start: 2023-08-30 | End: 2023-09-04 | Stop reason: HOSPADM

## 2023-08-30 RX ADMIN — TETANUS TOXOID, REDUCED DIPHTHERIA TOXOID AND ACELLULAR PERTUSSIS VACCINE, ADSORBED 0.5 ML: 5; 2.5; 8; 8; 2.5 SUSPENSION INTRAMUSCULAR at 10:08

## 2023-08-30 RX ADMIN — PIPERACILLIN AND TAZOBACTAM 3.38 G: 3; .375 INJECTION, POWDER, LYOPHILIZED, FOR SOLUTION INTRAVENOUS; PARENTERAL at 10:08

## 2023-08-30 RX ADMIN — OXYCODONE HYDROCHLORIDE AND ACETAMINOPHEN 1 TABLET: 5; 325 TABLET ORAL at 01:08

## 2023-08-30 RX ADMIN — ENOXAPARIN SODIUM 40 MG: 40 INJECTION SUBCUTANEOUS at 05:08

## 2023-08-30 RX ADMIN — HYDROCODONE BITARTRATE AND ACETAMINOPHEN 1 TABLET: 5; 325 TABLET ORAL at 09:08

## 2023-08-30 RX ADMIN — OXYCODONE HYDROCHLORIDE AND ACETAMINOPHEN 1 TABLET: 5; 325 TABLET ORAL at 07:08

## 2023-08-30 RX ADMIN — VANCOMYCIN HYDROCHLORIDE 1000 MG: 1 INJECTION, POWDER, LYOPHILIZED, FOR SOLUTION INTRAVENOUS at 11:08

## 2023-08-30 RX ADMIN — PIPERACILLIN AND TAZOBACTAM 3.38 G: 3; .375 INJECTION, POWDER, LYOPHILIZED, FOR SOLUTION INTRAVENOUS; PARENTERAL at 05:08

## 2023-08-30 NOTE — H&P
[facility] Medicine  History & Physical    Patient Name: Mariela Solano  MRN: [id number]  Patient Class: OP- Observation  Admission Date: 8/30/2023  Attending Physician: Ngozi Randle MD   Primary Care Provider: Eri Primary Doctor         Patient information was obtained from patient and ER records.     Subjective:     Principal Problem:Cat bite of hand    Chief Complaint:   Chief Complaint   Patient presents with    Hand Injury     Patient states last night her cat bit and scratched her on her right hand.          HPI: 83 yo female with PMH of liver disease, osteoarthritis presents to the ER with complaints of right hand swelling after a cat bite. Patient was bitten by her cat last night after trying to stop the cat and dog from attacking each other. Her hand initially felt fine but over the course of the night she developed more pain. This morning the hand was very swollen and she was having difficulty moving her fingers so came to the ER. In the ER, her WBC was normal. HR and BP normal. No hypotension. Lactic acid normal. The hand appeared severely swollen with streaking up the arm. She has had multiple joint replacements. Hospital team called for admission.      Past Medical History:   Diagnosis Date    Liver disease     Osteoarthritis        Past Surgical History:   Procedure Laterality Date    BREAST BIOPSY      LUMBAR FUSION      TOTAL KNEE ARTHROPLASTY Bilateral     TUBAL LIGATION         Review of patient's allergies indicates:   Allergen Reactions    Iodinated contrast media     Metrizamide Hives    Morphine Other (See Comments)     Took two days to recover    Oxycodone-acetaminophen Other (See Comments)     CONSTIPATION       No current facility-administered medications on file prior to encounter.     Current Outpatient Medications on File Prior to Encounter   Medication Sig    calcium carbonate-vitamin D3 500 mg(1,250mg) -125 unit per tablet Take 1,500 mg by  mouth.    cetirizine (ZYRTEC) 10 MG tablet Take 10 mg by mouth once daily.    hydrocodone-acetaminophen 10-325mg (NORCO)  mg Tab Take by mouth.    melatonin 10 mg Cap Take by mouth.    ospemifene (OSPHENA) 60 mg Tab Take 1 tablet by mouth once daily. With food.    oxybutynin (DITROPAN) 5 MG Tab Take 5 mg by mouth.    tramadol (ULTRAM) 50 mg tablet Take 50 mg by mouth every 6 (six) hours as needed for Pain.    TUMERIC-GING-OLIVE-OREG-CAPRYL ORAL Take by mouth.    venlafaxine (EFFEXOR-XR) 75 MG 24 hr capsule Take 75 mg by mouth once daily.    biotin 1 mg Cap Take 10,000 mcg by mouth.    fluconazole (DIFLUCAN) 150 MG Tab Take one by mouth now and may repeat in 72 hours. (Patient not taking: Reported on 4/14/2022)    pseudoephedrine (SUDAFED) 30 MG tablet Take 30 mg by mouth every 4 (four) hours as needed for Congestion.    vitamin D 1000 units Tab Take 5,000 Units by mouth.     Family History       Problem Relation (Age of Onset)    Breast cancer Sister, Paternal Aunt          Tobacco Use    Smoking status: Never    Smokeless tobacco: Never   Substance and Sexual Activity    Alcohol use: No    Drug use: No    Sexual activity: Never     Birth control/protection: None     Review of Systems  Objective:     Vital Signs (Most Recent):  Temp: 99.2 °F (37.3 °C) (08/30/23 0910)  Pulse: 60 (08/30/23 1242)  Resp: 18 (08/30/23 1347)  BP: (!) 142/72 (08/30/23 1251)  SpO2: 99 % (08/30/23 1242) Vital Signs (24h Range):  Temp:  [99.2 °F (37.3 °C)] 99.2 °F (37.3 °C)  Pulse:  [60-72] 60  Resp:  [15-20] 18  SpO2:  [97 %-99 %] 99 %  BP: (130-142)/(62-72) 142/72     Weight: 62.6 kg (138 lb)  Body mass index is 27.87 kg/m².     Physical Exam  Vitals reviewed.   Constitutional:       General: She is not in acute distress.     Appearance: She is not ill-appearing or toxic-appearing.   Cardiovascular:      Rate and Rhythm: Normal rate and regular rhythm.      Pulses: Normal pulses.      Heart sounds: No murmur heard.      No gallop.   Pulmonary:      Effort: Pulmonary effort is normal.      Breath sounds: No wheezing or rales.   Abdominal:      General: There is no distension.   Musculoskeletal:      Comments: Edema of the right hand and forearm. Erythema extending up the forearm.    Skin:     Findings: Bruising and erythema present.   Neurological:      General: No focal deficit present.      Mental Status: She is alert.      Comments: Neurovascularly intact   Psychiatric:         Mood and Affect: Mood normal.                      Significant Labs: All pertinent labs within the past 24 hours have been reviewed.  CBC:   Recent Labs   Lab 08/30/23  1004   WBC 9.16   HGB 11.3*   HCT 34.4*   *     CMP:   Recent Labs   Lab 08/30/23  1130   *   K 3.8      CO2 23   *   BUN 26*   CREATININE 1.0   CALCIUM 8.8   PROT 6.5   ALBUMIN 3.3*   BILITOT 1.1*   ALKPHOS 127   AST 23   ALT 14   ANIONGAP 11     Lactic Acid:   Recent Labs   Lab 08/30/23  1004   LACTATE 0.8       Significant Imaging: I have reviewed all pertinent imaging results/findings within the past 24 hours.  X-Ray Wrist Complete Right   Final Result      1. Diffuse soft tissue swelling consistent with cellulitis.   2. Chronic severe degenerative osteoarthrosis of the 1st MCP joint.   3. Chondrocalcinosis.         Electronically signed by: Lefty Landry   Date:    08/30/2023   Time:    10:30            Assessment/Plan:     * Cat bite of hand  Admit with IV zosyn  Daily labs  CRP, WBC, vitals, lactic acid are all promising in terms of prognosis  Low threshold to consult Ortho for evaluation though on exam, she was already improving after a dose of IV antibiotics  Monitor for fever, worsening swelling or neurovascular compromise  No indication for IV resuscitation  Though she is showing a good response to treatment, patient absolutely needs to be in the hospital for IV antibiotics         VTE Risk Mitigation (From admission, onward)         Ordered      enoxaparin injection 40 mg  Daily         08/30/23 1328     IP VTE HIGH RISK PATIENT  Once         08/30/23 1328     Place sequential compression device  Until discontinued         08/30/23 1328                   On 08/30/2023, patient should be placed in hospital observation services under my care.        Ngozi Randle MD  Department of Hospital Medicine  [place] - Emergency Dept

## 2023-08-30 NOTE — ED TRIAGE NOTES
Reports scratch from her cat last night to right hand. Cat is not up to date on vaccines. Right hand edematous, red, warm and painful.

## 2023-08-30 NOTE — ED PROVIDER NOTES
Encounter Date: 8/30/2023       History     Chief Complaint   Patient presents with    Hand Injury     Patient states last night her cat bit and scratched her on her right hand.       Patient is an 82-year-old female with history of osteoarthritis and liver disease here with complaints of rapid increased painful red swelling to right hand and wrist that began after her house cat attacked her in a fit of panic secondary to another dog trying to get into the house.  Patient does not remember her last tetanus shot.  Pain is aching/throbbing, severe, constant worse with movement, better with rest and elevation.    The history is provided by the patient, a relative and medical records.     Review of patient's allergies indicates:   Allergen Reactions    Iodinated contrast media     Metrizamide Hives    Morphine Other (See Comments)     Took two days to recover    Oxycodone-acetaminophen Other (See Comments)     CONSTIPATION     Past Medical History:   Diagnosis Date    Liver disease     Osteoarthritis      Past Surgical History:   Procedure Laterality Date    BREAST BIOPSY      LUMBAR FUSION      TOTAL KNEE ARTHROPLASTY Bilateral     TUBAL LIGATION       Family History   Problem Relation Age of Onset    Breast cancer Sister     Breast cancer Paternal Aunt      Social History     Tobacco Use    Smoking status: Never    Smokeless tobacco: Never   Substance Use Topics    Alcohol use: No    Drug use: No     Review of Systems   Constitutional:  Negative for fever.   HENT:  Negative for sore throat.    Respiratory:  Negative for shortness of breath.    Cardiovascular:  Negative for chest pain.   Gastrointestinal:  Negative for nausea.   Genitourinary:  Negative for dysuria.   Musculoskeletal:  Positive for joint swelling. Negative for back pain.   Skin:  Positive for rash.   Neurological:  Negative for weakness.   Hematological:  Does not bruise/bleed easily.   All other systems reviewed and are negative.      Physical Exam      Initial Vitals [08/30/23 0910]   BP Pulse Resp Temp SpO2   130/62 72 18 99.2 °F (37.3 °C) 97 %      MAP       --         Physical Exam    Nursing note and vitals reviewed.  Constitutional: She appears well-developed and well-nourished.   HENT:   Head: Normocephalic and atraumatic.   Eyes: EOM are normal.   Neck: Neck supple.   Cardiovascular:  Normal rate.           Pulmonary/Chest: No respiratory distress.   Abdominal: Abdomen is soft.   Musculoskeletal:         General: Tenderness and edema present.      Cervical back: Neck supple.      Comments: Diffuse warm tenderness swelling involving dorsal aspect right hand and extensor surface wrist.  Fingers are mildly swollen but nontender with normal range of motion.  Mild decreased range of motion right wrist.  Right elbow intact.     Neurological: She is alert and oriented to person, place, and time.   Skin: Skin is warm and dry.   Diffuse warm erythema dorsal aspect right hand and extensor surface right wrist to mid forearm with lymphangitic spread towards elbow on ulnar aspect.  No discrete fluctuance or drainage.  Multiple puncture wounds dorsal aspect right wrist and hand consistent with stated cat bites/ scratches.   Psychiatric: She has a normal mood and affect.         ED Course   Procedures  Labs Reviewed   CBC W/ AUTO DIFFERENTIAL - Abnormal; Notable for the following components:       Result Value    RBC 3.62 (*)     Hemoglobin 11.3 (*)     Hematocrit 34.4 (*)     MCH 31.2 (*)     Platelets 140 (*)     Lymph # 0.9 (*)     Gran % 80.0 (*)     Lymph % 9.4 (*)     All other components within normal limits   CULTURE, BLOOD   CULTURE, BLOOD   LACTIC ACID, PLASMA   COMPREHENSIVE METABOLIC PANEL   C-REACTIVE PROTEIN          Imaging Results              X-Ray Wrist Complete Right (Final result)  Result time 08/30/23 10:30:09      Final result by Lefty Landry MD (08/30/23 10:30:09)                   Impression:      1. Diffuse soft tissue swelling  consistent with cellulitis.  2. Chronic severe degenerative osteoarthrosis of the 1st MCP joint.  3. Chondrocalcinosis.      Electronically signed by: Lefty Landry  Date:    08/30/2023  Time:    10:30               Narrative:    EXAMINATION:  XR WRIST COMPLETE 3 VIEWS RIGHT    CLINICAL HISTORY:  Pain, unspecified    TECHNIQUE:  PA, lateral, and oblique views of the right wrist were performed.    COMPARISON:  None.    FINDINGS:  There is mild diffuse soft tissue swelling suspicious for a cellulitis.  No radiopaque foreign body.    There is chronic severe degenerative osteoarthrosis of the 1st CMC joint with mild radial subluxation of the proximal head of the 1st metacarpal.  Remaining carpal bones intact.  Distal radius and ulna intact.  Calcification of the triangular fibrocartilage.    There is subluxation of the 2nd through 5th MCP joints.  There is bone demineralization.                                       Medications   piperacillin-tazobactam (ZOSYN) 3.375 g in dextrose 5 % in water (D5W) 100 mL IVPB (MB+) (3.375 g Intravenous New Bag 8/30/23 1005)   vancomycin (VANCOCIN) 1,000 mg in dextrose 5 % (D5W) 250 mL IVPB (Vial-Mate) (has no administration in time range)   Tdap (BOOSTRIX) vaccine injection 0.5 mL (0.5 mLs Intramuscular Given 8/30/23 1015)   HYDROcodone-acetaminophen 5-325 mg per tablet 1 tablet (1 tablet Oral Given 8/30/23 0948)     Medical Decision Making  Diagnosis:  Cellulitis secondary to cat bite/scratches.  Patient is an 82-year-old female with rapidly expanding infection involving right hand and wrist secondary to her CT attacking her in a fit of panic yesterday.  Plan to check labs, image and admit until stabilize/ symptoms regress.    Amount and/or Complexity of Data Reviewed  Labs: ordered.  Radiology: ordered.    Risk  Prescription drug management.                               Clinical Impression:   Final diagnoses:  [R52] Pain  [L03.114] Cellulitis of hand without finger or thumb, left  (Primary)  [W55.01XA] Cat bite, initial encounter        ED Disposition Condition    Observation Stable                Lefty Grant MD  08/30/23 1053

## 2023-08-30 NOTE — SUBJECTIVE & OBJECTIVE
Past Medical History:   Diagnosis Date    Liver disease     Osteoarthritis        Past Surgical History:   Procedure Laterality Date    BREAST BIOPSY      LUMBAR FUSION      TOTAL KNEE ARTHROPLASTY Bilateral     TUBAL LIGATION         Review of patient's allergies indicates:   Allergen Reactions    Iodinated contrast media     Metrizamide Hives    Morphine Other (See Comments)     Took two days to recover    Oxycodone-acetaminophen Other (See Comments)     CONSTIPATION       No current facility-administered medications on file prior to encounter.     Current Outpatient Medications on File Prior to Encounter   Medication Sig    calcium carbonate-vitamin D3 500 mg(1,250mg) -125 unit per tablet Take 1,500 mg by mouth.    cetirizine (ZYRTEC) 10 MG tablet Take 10 mg by mouth once daily.    hydrocodone-acetaminophen 10-325mg (NORCO)  mg Tab Take by mouth.    melatonin 10 mg Cap Take by mouth.    ospemifene (OSPHENA) 60 mg Tab Take 1 tablet by mouth once daily. With food.    oxybutynin (DITROPAN) 5 MG Tab Take 5 mg by mouth.    tramadol (ULTRAM) 50 mg tablet Take 50 mg by mouth every 6 (six) hours as needed for Pain.    TUMERIC-GING-OLIVE-OREG-CAPRYL ORAL Take by mouth.    venlafaxine (EFFEXOR-XR) 75 MG 24 hr capsule Take 75 mg by mouth once daily.    biotin 1 mg Cap Take 10,000 mcg by mouth.    fluconazole (DIFLUCAN) 150 MG Tab Take one by mouth now and may repeat in 72 hours. (Patient not taking: Reported on 4/14/2022)    pseudoephedrine (SUDAFED) 30 MG tablet Take 30 mg by mouth every 4 (four) hours as needed for Congestion.    vitamin D 1000 units Tab Take 5,000 Units by mouth.     Family History       Problem Relation (Age of Onset)    Breast cancer Sister, Paternal Aunt          Tobacco Use    Smoking status: Never    Smokeless tobacco: Never   Substance and Sexual Activity    Alcohol use: No    Drug use: No    Sexual activity: Never     Birth control/protection: None     Review of Systems  Objective:     Vital  Signs (Most Recent):  Temp: 99.2 °F (37.3 °C) (08/30/23 0910)  Pulse: 60 (08/30/23 1242)  Resp: 18 (08/30/23 1347)  BP: (!) 142/72 (08/30/23 1251)  SpO2: 99 % (08/30/23 1242) Vital Signs (24h Range):  Temp:  [99.2 °F (37.3 °C)] 99.2 °F (37.3 °C)  Pulse:  [60-72] 60  Resp:  [15-20] 18  SpO2:  [97 %-99 %] 99 %  BP: (130-142)/(62-72) 142/72     Weight: 62.6 kg (138 lb)  Body mass index is 27.87 kg/m².     Physical Exam  Vitals reviewed.   Constitutional:       General: She is not in acute distress.     Appearance: She is not ill-appearing or toxic-appearing.   Cardiovascular:      Rate and Rhythm: Normal rate and regular rhythm.      Pulses: Normal pulses.      Heart sounds: No murmur heard.     No gallop.   Pulmonary:      Effort: Pulmonary effort is normal.      Breath sounds: No wheezing or rales.   Abdominal:      General: There is no distension.   Musculoskeletal:      Comments: Edema of the right hand and forearm. Erythema extending up the forearm.    Skin:     Findings: Bruising and erythema present.   Neurological:      General: No focal deficit present.      Mental Status: She is alert.      Comments: Neurovascularly intact   Psychiatric:         Mood and Affect: Mood normal.                      Significant Labs: All pertinent labs within the past 24 hours have been reviewed.  CBC:   Recent Labs   Lab 08/30/23  1004   WBC 9.16   HGB 11.3*   HCT 34.4*   *     CMP:   Recent Labs   Lab 08/30/23  1130   *   K 3.8      CO2 23   *   BUN 26*   CREATININE 1.0   CALCIUM 8.8   PROT 6.5   ALBUMIN 3.3*   BILITOT 1.1*   ALKPHOS 127   AST 23   ALT 14   ANIONGAP 11     Lactic Acid:   Recent Labs   Lab 08/30/23  1004   LACTATE 0.8       Significant Imaging: I have reviewed all pertinent imaging results/findings within the past 24 hours.  X-Ray Wrist Complete Right   Final Result      1. Diffuse soft tissue swelling consistent with cellulitis.   2. Chronic severe degenerative osteoarthrosis of the  1st MCP joint.   3. Chondrocalcinosis.         Electronically signed by: Lefty Landry   Date:    08/30/2023   Time:    10:30

## 2023-08-30 NOTE — HPI
81 yo female with PMH of liver disease, osteoarthritis presents to the ER with complaints of right hand swelling after a cat bite. Patient was bitten by her cat last night after trying to stop the cat and dog from attacking each other. Her hand initially felt fine but over the course of the night she developed more pain. This morning the hand was very swollen and she was having difficulty moving her fingers so came to the ER. In the ER, her WBC was normal. HR and BP normal. No hypotension. Lactic acid normal. The hand appeared severely swollen with streaking up the arm. She has had multiple joint replacements. Hospital team called for admission.

## 2023-08-30 NOTE — ASSESSMENT & PLAN NOTE
Admit with IV zosyn  Daily labs  CRP, WBC, vitals, lactic acid are all promising in terms of prognosis  Low threshold to consult Ortho for evaluation though on exam, she was already improving after a dose of IV antibiotics  Monitor for fever, worsening swelling or neurovascular compromise  No indication for IV resuscitation  Though she is showing a good response to treatment, patient absolutely needs to be in the hospital for IV antibiotics

## 2023-08-31 PROBLEM — S61.459A CAT BITE OF HAND: Status: RESOLVED | Noted: 2023-08-30 | Resolved: 2023-08-31
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
## 2023-08-31 PROBLEM — L03.113 CELLULITIS OF RIGHT HAND: Status: ACTIVE | Noted: 2023-08-31

## 2023-08-31 PROBLEM — L03.011 CELLULITIS OF FINGER OF RIGHT HAND: Status: ACTIVE | Noted: 2023-08-31

## 2023-08-31 LAB
ALBUMIN SERPL BCP-MCNC: 2.9 G/DL (ref 3.5–5.2)
ALP SERPL-CCNC: 112 U/L (ref 55–135)
ALT SERPL W/O P-5'-P-CCNC: 11 U/L (ref 10–44)
ANION GAP SERPL CALC-SCNC: 12 MMOL/L (ref 8–16)
AST SERPL-CCNC: 17 U/L (ref 10–40)
BASOPHILS # BLD AUTO: 0.02 K/UL (ref 0–0.2)
BASOPHILS NFR BLD: 0.3 % (ref 0–1.9)
BILIRUB SERPL-MCNC: 1.6 MG/DL (ref 0.1–1)
BUN SERPL-MCNC: 21 MG/DL (ref 8–23)
CALCIUM SERPL-MCNC: 8.6 MG/DL (ref 8.7–10.5)
CHLORIDE SERPL-SCNC: 102 MMOL/L (ref 95–110)
CO2 SERPL-SCNC: 22 MMOL/L (ref 23–29)
CREAT SERPL-MCNC: 1 MG/DL (ref 0.5–1.4)
DIFFERENTIAL METHOD: ABNORMAL
EOSINOPHIL # BLD AUTO: 0.1 K/UL (ref 0–0.5)
EOSINOPHIL NFR BLD: 1.6 % (ref 0–8)
ERYTHROCYTE [DISTWIDTH] IN BLOOD BY AUTOMATED COUNT: 12.8 % (ref 11.5–14.5)
EST. GFR  (NO RACE VARIABLE): 57 ML/MIN/1.73 M^2
GLUCOSE SERPL-MCNC: 96 MG/DL (ref 70–110)
HCT VFR BLD AUTO: 32.7 % (ref 37–48.5)
HGB BLD-MCNC: 10.7 G/DL (ref 12–16)
IMM GRANULOCYTES # BLD AUTO: 0.03 K/UL (ref 0–0.04)
IMM GRANULOCYTES NFR BLD AUTO: 0.4 % (ref 0–0.5)
LYMPHOCYTES # BLD AUTO: 1 K/UL (ref 1–4.8)
LYMPHOCYTES NFR BLD: 12.1 % (ref 18–48)
MAGNESIUM SERPL-MCNC: 1.8 MG/DL (ref 1.6–2.6)
MCH RBC QN AUTO: 31.1 PG (ref 27–31)
MCHC RBC AUTO-ENTMCNC: 32.7 G/DL (ref 32–36)
MCV RBC AUTO: 95 FL (ref 82–98)
MONOCYTES # BLD AUTO: 0.8 K/UL (ref 0.3–1)
MONOCYTES NFR BLD: 9.9 % (ref 4–15)
NEUTROPHILS # BLD AUTO: 6 K/UL (ref 1.8–7.7)
NEUTROPHILS NFR BLD: 75.7 % (ref 38–73)
NRBC BLD-RTO: 0 /100 WBC
PLATELET # BLD AUTO: 104 K/UL (ref 150–450)
PMV BLD AUTO: 12.3 FL (ref 9.2–12.9)
POTASSIUM SERPL-SCNC: 3.6 MMOL/L (ref 3.5–5.1)
PROT SERPL-MCNC: 6.1 G/DL (ref 6–8.4)
RBC # BLD AUTO: 3.44 M/UL (ref 4–5.4)
SODIUM SERPL-SCNC: 136 MMOL/L (ref 136–145)
WBC # BLD AUTO: 7.94 K/UL (ref 3.9–12.7)

## 2023-08-31 PROCEDURE — 36415 COLL VENOUS BLD VENIPUNCTURE: CPT | Performed by: FAMILY MEDICINE

## 2023-08-31 PROCEDURE — 85025 COMPLETE CBC W/AUTO DIFF WBC: CPT | Performed by: FAMILY MEDICINE

## 2023-08-31 PROCEDURE — A9585 GADOBUTROL INJECTION: HCPCS | Performed by: STUDENT IN AN ORGANIZED HEALTH CARE EDUCATION/TRAINING PROGRAM

## 2023-08-31 PROCEDURE — 25500020 PHARM REV CODE 255: Performed by: STUDENT IN AN ORGANIZED HEALTH CARE EDUCATION/TRAINING PROGRAM

## 2023-08-31 PROCEDURE — 99233 PR SUBSEQUENT HOSPITAL CARE,LEVL III: ICD-10-PCS | Mod: ,,, | Performed by: STUDENT IN AN ORGANIZED HEALTH CARE EDUCATION/TRAINING PROGRAM

## 2023-08-31 PROCEDURE — 21400001 HC TELEMETRY ROOM

## 2023-08-31 PROCEDURE — 25000003 PHARM REV CODE 250: Performed by: FAMILY MEDICINE

## 2023-08-31 PROCEDURE — 25000003 PHARM REV CODE 250: Performed by: STUDENT IN AN ORGANIZED HEALTH CARE EDUCATION/TRAINING PROGRAM

## 2023-08-31 PROCEDURE — 11000001 HC ACUTE MED/SURG PRIVATE ROOM

## 2023-08-31 PROCEDURE — 63600175 PHARM REV CODE 636 W HCPCS: Performed by: FAMILY MEDICINE

## 2023-08-31 PROCEDURE — 63600175 PHARM REV CODE 636 W HCPCS: Performed by: STUDENT IN AN ORGANIZED HEALTH CARE EDUCATION/TRAINING PROGRAM

## 2023-08-31 PROCEDURE — 80053 COMPREHEN METABOLIC PANEL: CPT | Performed by: FAMILY MEDICINE

## 2023-08-31 PROCEDURE — 94761 N-INVAS EAR/PLS OXIMETRY MLT: CPT

## 2023-08-31 PROCEDURE — 83735 ASSAY OF MAGNESIUM: CPT | Performed by: FAMILY MEDICINE

## 2023-08-31 PROCEDURE — 99233 SBSQ HOSP IP/OBS HIGH 50: CPT | Mod: ,,, | Performed by: STUDENT IN AN ORGANIZED HEALTH CARE EDUCATION/TRAINING PROGRAM

## 2023-08-31 RX ORDER — MUPIROCIN 20 MG/G
OINTMENT TOPICAL 2 TIMES DAILY
Status: DISCONTINUED | OUTPATIENT
Start: 2023-08-31 | End: 2023-09-04 | Stop reason: HOSPADM

## 2023-08-31 RX ORDER — GADOBUTROL 604.72 MG/ML
6 INJECTION INTRAVENOUS
Status: COMPLETED | OUTPATIENT
Start: 2023-08-31 | End: 2023-08-31

## 2023-08-31 RX ADMIN — PIPERACILLIN AND TAZOBACTAM 3.38 G: 3; .375 INJECTION, POWDER, LYOPHILIZED, FOR SOLUTION INTRAVENOUS; PARENTERAL at 04:08

## 2023-08-31 RX ADMIN — Medication 800 MG: at 08:08

## 2023-08-31 RX ADMIN — OXYCODONE HYDROCHLORIDE 5 MG: 5 TABLET ORAL at 11:08

## 2023-08-31 RX ADMIN — OXYCODONE HYDROCHLORIDE AND ACETAMINOPHEN 1 TABLET: 5; 325 TABLET ORAL at 01:08

## 2023-08-31 RX ADMIN — PIPERACILLIN AND TAZOBACTAM 3.38 G: 3; .375 INJECTION, POWDER, LYOPHILIZED, FOR SOLUTION INTRAVENOUS; PARENTERAL at 05:08

## 2023-08-31 RX ADMIN — VENLAFAXINE HYDROCHLORIDE 75 MG: 37.5 CAPSULE, EXTENDED RELEASE ORAL at 08:08

## 2023-08-31 RX ADMIN — OXYCODONE HYDROCHLORIDE AND ACETAMINOPHEN 1 TABLET: 5; 325 TABLET ORAL at 09:08

## 2023-08-31 RX ADMIN — ENOXAPARIN SODIUM 40 MG: 40 INJECTION SUBCUTANEOUS at 06:08

## 2023-08-31 RX ADMIN — PIPERACILLIN AND TAZOBACTAM 3.38 G: 3; .375 INJECTION, POWDER, LYOPHILIZED, FOR SOLUTION INTRAVENOUS; PARENTERAL at 10:08

## 2023-08-31 RX ADMIN — VANCOMYCIN HYDROCHLORIDE 1000 MG: 1 INJECTION, POWDER, LYOPHILIZED, FOR SOLUTION INTRAVENOUS at 01:08

## 2023-08-31 RX ADMIN — TRAZODONE HYDROCHLORIDE 25 MG: 50 TABLET ORAL at 09:08

## 2023-08-31 RX ADMIN — MUPIROCIN: 20 OINTMENT TOPICAL at 09:08

## 2023-08-31 RX ADMIN — OXYCODONE HYDROCHLORIDE AND ACETAMINOPHEN 1 TABLET: 5; 325 TABLET ORAL at 03:08

## 2023-08-31 RX ADMIN — MUPIROCIN: 20 OINTMENT TOPICAL at 01:08

## 2023-08-31 RX ADMIN — OXYCODONE HYDROCHLORIDE 5 MG: 5 TABLET ORAL at 06:08

## 2023-08-31 RX ADMIN — GADOBUTROL 6 ML: 604.72 INJECTION INTRAVENOUS at 02:08

## 2023-08-31 RX ADMIN — PIPERACILLIN AND TAZOBACTAM 3.38 G: 3; .375 INJECTION, POWDER, LYOPHILIZED, FOR SOLUTION INTRAVENOUS; PARENTERAL at 11:08

## 2023-08-31 RX ADMIN — OXYCODONE HYDROCHLORIDE 5 MG: 5 TABLET ORAL at 04:08

## 2023-08-31 RX ADMIN — OXYCODONE HYDROCHLORIDE AND ACETAMINOPHEN 1 TABLET: 5; 325 TABLET ORAL at 08:08

## 2023-08-31 NOTE — ASSESSMENT & PLAN NOTE
2/2 cat bite  Tetanus shot in ED  Continue broad spectrum abx  MRI w/o abscess  Low threshold to consult ortho if hand stops improving  Artis borders of cellulitis  q6h neurovascular checks.

## 2023-08-31 NOTE — PLAN OF CARE
[facility] - Intensive Care  Discharge Reassessment    Primary Care Provider: No, Primary Doctor    Expected Discharge Date: 9/1/2023    Patient resting in bed with daughters & grandson at bedside. She had her MRI today & waiting for results. She said the doctor said she may need surgery. Plan for right now is to continue with IV antibiotics. No other needs at this time. Will continue to follow.  Reassessment (most recent)       Discharge Reassessment - 08/31/23 2079          Discharge Reassessment    Assessment Type Discharge Planning Reassessment     Did the patient's condition or plan change since previous assessment? No     Discharge Plan discussed with: Patient     Communicated NICOLLE with patient/caregiver Date not available/Unable to determine     Discharge Plan A Home with family     DME Needed Upon Discharge  none     Transition of Care Barriers None     Why the patient remains in the hospital Requires continued medical care        Post-Acute Status    Discharge Delays None known at this time

## 2023-08-31 NOTE — PLAN OF CARE
[facility] - Intensive Care  Initial Discharge Assessment       Primary Care Provider: No, Primary Doctor    Admission Diagnosis: Pain [R52]  Cat bite of hand [S61.459A, W55.01XA]  Chest pain [R07.9]  Cellulitis of hand without finger or thumb, left [L03.114]  Cat bite, initial encounter [W55.01XA]    Admission Date: 8/30/2023  Expected Discharge Date: 9/1/2023  Assessment completed with patient who was alert and oriented. Patient lives with her spouse, Gray Solano [phone] and her daughter Krystal Solano lives next door. She does not use any equipment at home. She sees a GI doctor. Patient does not participate in outpatient therapies, and does not have any home health or OPCM services. 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 Patient's PCP is Dr. David Madrigal with [facility] and her pharmacy of choice is [facility].  Transition of Care Barriers: None    Payor: MEDICARE / Plan: MEDICARE PART A & B / Product Type: Government /     Extended Emergency Contact Information  Primary Emergency Contact: Krystal Solano  Mobile Phone: [phone]  Relation: Daughter  Preferred language: English   needed? No  Secondary Emergency Contact: Gray Solano  Address: [address]           [place] [address] [facility]  Home Phone: [phone]  Mobile Phone: [phone]  Relation: Spouse    Discharge Plan A: Home with family  Discharge Plan B: Home with family      [facility] - [place] - [address]  [address]  [place] [address]  Phone: [phone] Fax: [phone]    [facility] - [place], [place] - [address] [facility]  [address] [address]  Phone: [phone] Fax: [phone]      Initial Assessment (most recent)       Adult Discharge Assessment - 08/31/23 1006          Discharge Assessment    Assessment Type Discharge Planning Assessment     Confirmed/corrected address, phone number and  insurance Yes     Confirmed Demographics Correct on Facesheet     Source of Information patient     Does patient/caregiver understand observation status Yes     Communicated NICOLLE with patient/caregiver Date not available/Unable to determine     Reason For Admission pain     People in Home spouse   Therese Solano [phone]    Do you expect to return to your current living situation? Yes     Do you have help at home or someone to help you manage your care at home? Yes     Who are your caregiver(s) and their phone number(s)? Therese Solano [phone]; daughter Krystal Solano lives next door     Prior to hospitilization cognitive status: Unable to Assess     Current cognitive status: Alert/Oriented     Equipment Currently Used at Home none     Readmission within 30 days? No     Patient currently being followed by outpatient case management? No     Do you currently have service(s) that help you manage your care at home? No     Do you take prescription medications? Yes     Do you have prescription coverage? Yes     Do you have any problems affording any of your prescribed medications? No     Is the patient taking medications as prescribed? yes     Who is going to help you get home at discharge? Therese Solano [phone]; daughter Krystal Solano lives next door     How do you get to doctors appointments? car, drives self     Are you on dialysis? No     Do you take coumadin? No     DME Needed Upon Discharge  none     Discharge Plan discussed with: Patient     Transition of Care Barriers None     Discharge Plan A Home with family     Discharge Plan B Home with family        Physical Activity    On average, how many days per week do you engage in moderate to strenuous exercise (like a brisk walk)? 7 days     On average, how many minutes do you engage in exercise at this level? 120 min        Financial Resource Strain    How hard is it for you to pay for the very basics like food, housing, medical care, and heating?  Not very hard        Housing Stability    In the last 12 months, was there a time when you were not able to pay the mortgage or rent on time? No     In the last 12 months, was there a time when you did not have a steady place to sleep or slept in a shelter (including now)? No        Transportation Needs    In the past 12 months, has lack of transportation kept you from medical appointments or from getting medications? No     In the past 12 months, has lack of transportation kept you from meetings, work, or from getting things needed for daily living? No        Food Insecurity    Within the past 12 months, you worried that your food would run out before you got the money to buy more. Never true     Within the past 12 months, the food you bought just didn't last and you didn't have money to get more. Never true        Stress    Do you feel stress - tense, restless, nervous, or anxious, or unable to sleep at night because your mind is troubled all the time - these days? To some extent        Social Connections    In a typical week, how many times do you talk on the phone with family, friends, or neighbors? More than three times a week     How often do you get together with friends or relatives? More than three times a week     How often do you attend Judaism or Yazidism services? More than 4 times per year     Do you belong to any clubs or organizations such as Judaism groups, unions, fraternal or athletic groups, or school groups? Yes     How often do you attend meetings of the clubs or organizations you belong to? Never     Are you , , , , never , or living with a partner?         Alcohol Use    Q1: How often do you have a drink containing alcohol? Never     Q2: How many drinks containing alcohol do you have on a typical day when you are drinking? Patient does not drink     Q3: How often do you have six or more drinks on one occasion? Never        OTHER    Name(s) of People  in Home Therese Russ [phone]; daughter Krystal Solano lives next door

## 2023-08-31 NOTE — PROGRESS NOTES
[facility] Medicine  Progress Note    Patient Name: Mariela Solano  MRN: [id number]  Patient Class: IP- Inpatient   Admission Date: 8/30/2023  Length of Stay: 0 days  Attending Physician: Bryce Patel MD  Primary Care Provider: Eri, Primary Doctor        Subjective:     Principal Problem:Cellulitis of right hand        HPI:  83 yo female with PMH of liver disease, osteoarthritis presents to the ER with complaints of right hand swelling after a cat bite. Patient was bitten by her cat last night after trying to stop the cat and dog from attacking each other. Her hand initially felt fine but over the course of the night she developed more pain. This morning the hand was very swollen and she was having difficulty moving her fingers so came to the ER. In the ER, her WBC was normal. HR and BP normal. No hypotension. Lactic acid normal. The hand appeared severely swollen with streaking up the arm. She has had multiple joint replacements. Hospital team called for admission.      Overview/Hospital Course:  No notes on file    Interval History: Hand improving. Adding vanc for MRSA coverage. MRI w/ no abscess. Artis borders of cellulitis.     Review of Systems   All other systems reviewed and are negative.    Objective:     Vital Signs (Most Recent):  Temp: 98.3 °F (36.8 °C) (08/31/23 0309)  Pulse: 72 (08/31/23 0740)  Resp: 18 (08/31/23 1359)  BP: 131/63 (08/31/23 0309)  SpO2: 95 % (08/31/23 0740) Vital Signs (24h Range):  Temp:  [98 °F (36.7 °C)-98.9 °F (37.2 °C)] 98.3 °F (36.8 °C)  Pulse:  [65-80] 72  Resp:  [1-18] 18  SpO2:  [93 %-97 %] 95 %  BP: (117-144)/(57-67) 131/63     Weight: 67.5 kg (148 lb 13 oz)  Body mass index is 30.06 kg/m².  No intake or output data in the 24 hours ending 08/31/23 1604      Physical Exam      Vitals reviewed  General: NAD, Well developed, Well Nourished  Head: NC/AT  Eyes: EOMI, JASE  Cardiovascular: Pulses intact distally, Regular Rate and rhythm  Pulmonary:  Normal Respiratory Rate, No respiratory distress  Gi: Soft, Non-tender  Extremities: Warm, No edema present in lower extremities. Severe deformity of bilateral hands 2/2 arthritis. Right hand with significant swelling and redness extending up forearm. Unable to bend fingers fully. Able to move wrist up and down. Good capillary refill. Sensation intact.  Skin: Warm, dry  Neuro: Alert, Oriented x3, No focal Deficit  Psych: Appropriate mood and affect      Significant Labs: All pertinent labs within the past 24 hours have been reviewed.    Significant Imaging: I have reviewed all pertinent imaging results/findings within the past 24 hours.      Assessment/Plan:      * Cellulitis of right hand  2/2 cat bite  Tetanus shot in ED  Continue broad spectrum abx  MRI w/o abscess  Low threshold to consult ortho if hand stops improving  Artis borders of cellulitis  q6h neurovascular checks.        VTE Risk Mitigation (From admission, onward)         Ordered     enoxaparin injection 40 mg  Daily         08/30/23 1328     IP VTE HIGH RISK PATIENT  Once         08/30/23 1328     Place sequential compression device  Until discontinued         08/30/23 1328                Discharge Planning   NICOLLE: 9/1/2023     Code Status: Full Code   Is the patient medically ready for discharge?:     Reason for patient still in hospital (select all that apply): Treatment  Discharge Plan A: Home with family                  Bryce Patel MD  Department of Hospital Medicine   [facility] - Intensive Care

## 2023-08-31 NOTE — PLAN OF CARE
08/31/23 1016   MEDRANO Message   Medicare Outpatient and Observation Notification regarding financial responsibility Explained to patient/caregiver;Signed/date by patient/caregiver   Date MEDRANO was signed 08/30/23   Time MEDRANO was signed 1321

## 2023-08-31 NOTE — SUBJECTIVE & OBJECTIVE
Interval History: Hand improving. Adding vanc for MRSA coverage. MRI w/ no abscess. Artis borders of cellulitis.     Review of Systems   All other systems reviewed and are negative.    Objective:     Vital Signs (Most Recent):  Temp: 98.3 °F (36.8 °C) (08/31/23 0309)  Pulse: 72 (08/31/23 0740)  Resp: 18 (08/31/23 1359)  BP: 131/63 (08/31/23 0309)  SpO2: 95 % (08/31/23 0740) Vital Signs (24h Range):  Temp:  [98 °F (36.7 °C)-98.9 °F (37.2 °C)] 98.3 °F (36.8 °C)  Pulse:  [65-80] 72  Resp:  [1-18] 18  SpO2:  [93 %-97 %] 95 %  BP: (117-144)/(57-67) 131/63     Weight: 67.5 kg (148 lb 13 oz)  Body mass index is 30.06 kg/m².  No intake or output data in the 24 hours ending 08/31/23 1604      Physical Exam      Vitals reviewed  General: NAD, Well developed, Well Nourished  Head: NC/AT  Eyes: EOMI, JASE  Cardiovascular: Pulses intact distally, Regular Rate and rhythm  Pulmonary: Normal Respiratory Rate, No respiratory distress  Gi: Soft, Non-tender  Extremities: Warm, No edema present in lower extremities. Severe deformity of bilateral hands 2/2 arthritis. Right hand with significant swelling and redness extending up forearm. Unable to bend fingers fully. Able to move wrist up and down. Good capillary refill. Sensation intact.  Skin: Warm, dry  Neuro: Alert, Oriented x3, No focal Deficit  Psych: Appropriate mood and affect      Significant Labs: All pertinent labs within the past 24 hours have been reviewed.    Significant Imaging: I have reviewed all pertinent imaging results/findings within the past 24 hours.

## 2023-08-31 NOTE — PLAN OF CARE
Problem: Adult Inpatient Plan of Care  Goal: Plan of Care Review  Outcome: Ongoing, Progressing     Problem: Pain Acute  Goal: Acceptable Pain Control and Functional Ability  Outcome: Ongoing, Progressing     Problem: Infection  Goal: Absence of Infection Signs and Symptoms  Outcome: Ongoing, Progressing     Problem: Fatigue  Goal: Improved Activity Tolerance  Outcome: Ongoing, Progressing     Problem: Activity Intolerance  Goal: Enhanced Capacity and Energy  Outcome: Ongoing, Progressing     Problem: Fall Injury Risk  Goal: Absence of Fall and Fall-Related Injury  Outcome: Ongoing, Progressing

## 2023-08-31 NOTE — PROGRESS NOTES
"Pharmacokinetic Initial Assessment: IV Vancomycin    Assessment/Plan:    Initiate intravenous vancomycin with 1000 mg every 24 hours.  Desired empiric serum trough concentration is 10 to 20 mcg/mL  Draw vancomycin trough level 60 min prior to third dose on 9/1/23 at approximately 11:00.  Pharmacy will continue to follow and monitor vancomycin.      Please contact pharmacy at extension [phone] with any questions regarding this assessment.     Thank you for the consult,   Bobby Blas, DeshawnD       Patient brief summary:  Mariela Solano is a 82 y.o. female initiated on antimicrobial therapy with IV Vancomycin for treatment of suspected bone/joint infection.    Drug Allergies:   Review of patient's allergies indicates:   Allergen Reactions    Iodinated contrast media     Metrizamide Hives    Morphine Other (See Comments)     Took two days to recover    Oxycodone-acetaminophen Other (See Comments)     CONSTIPATION       Actual Body Weight:   67.5 kg    Renal Function:   Estimated Creatinine Clearance: 39.9 mL/min (based on SCr of 1 mg/dL).,     Dialysis Method (if applicable):  N/A    CBC (last 72 hours):  Recent Labs   Lab Result Units 08/30/23  1004 08/31/23  0511   WBC K/uL 9.16 7.94   Hemoglobin g/dL 11.3* 10.7*   Hematocrit % 34.4* 32.7*   Platelets K/uL 140* 104*   Gran % % 80.0* 75.7*   Lymph % % 9.4* 12.1*   Mono % % 9.7 9.9   Eosinophil % % 0.4 1.6   Basophil % % 0.2 0.3   Differential Method  Automated Automated       Metabolic Panel (last 72 hours):  Recent Labs   Lab Result Units 08/30/23  1130 08/31/23  0511   Sodium mmol/L 135* 136   Potassium mmol/L 3.8 3.6   Chloride mmol/L 101 102   CO2 mmol/L 23 22*   Glucose mg/dL 150* 96   BUN mg/dL 26* 21   Creatinine mg/dL 1.0 1.0   Albumin g/dL 3.3* 2.9*   Total Bilirubin mg/dL 1.1* 1.6*   Alkaline Phosphatase U/L 127 112   AST U/L 23 17   ALT U/L 14 11   Magnesium mg/dL  --  1.8       Drug levels (last 3 results):  No results for input(s): "VANCOMYCINRA", " ""VANCORANDOM", "VANCOMYCINPE", "VANCOPEAK", "VANCOMYCINTR", "VANCOTROUGH" in the last 72 hours.    Microbiologic Results:  Microbiology Results (last 7 days)       Procedure Component Value Units Date/Time    Blood culture [[id number]] Collected: 08/30/23 1004    Order Status: Completed Specimen: Blood from Peripheral, Forearm, Left Updated: 08/31/23 0115     Blood Culture, Routine No Growth to date    Blood culture [[id number]] Collected: 08/30/23 1004    Order Status: Completed Specimen: Blood from Peripheral, Antecubital, Left Updated: 08/31/23 0115     Blood Culture, Routine No Growth to date            "

## 2023-08-31 NOTE — PLAN OF CARE
08/31/23 1619   Medicare Message   Important Message from Medicare regarding Discharge Appeal Rights Given to patient/caregiver;Explained to patient/caregiver;Signed/date by patient/caregiver   Date IMM was signed 08/31/23   Time IMM was signed [id number]

## 2023-09-01 LAB
ALBUMIN SERPL BCP-MCNC: 2.6 G/DL (ref 3.5–5.2)
ALP SERPL-CCNC: 116 U/L (ref 55–135)
ALT SERPL W/O P-5'-P-CCNC: 11 U/L (ref 10–44)
ANION GAP SERPL CALC-SCNC: 12 MMOL/L (ref 8–16)
AST SERPL-CCNC: 17 U/L (ref 10–40)
BASOPHILS # BLD AUTO: 0.02 K/UL (ref 0–0.2)
BASOPHILS NFR BLD: 0.4 % (ref 0–1.9)
BILIRUB SERPL-MCNC: 1 MG/DL (ref 0.1–1)
BUN SERPL-MCNC: 15 MG/DL (ref 8–23)
CALCIUM SERPL-MCNC: 8.4 MG/DL (ref 8.7–10.5)
CHLORIDE SERPL-SCNC: 104 MMOL/L (ref 95–110)
CO2 SERPL-SCNC: 19 MMOL/L (ref 23–29)
CREAT SERPL-MCNC: 1 MG/DL (ref 0.5–1.4)
DIFFERENTIAL METHOD: ABNORMAL
EOSINOPHIL # BLD AUTO: 0.2 K/UL (ref 0–0.5)
EOSINOPHIL NFR BLD: 4.3 % (ref 0–8)
ERYTHROCYTE [DISTWIDTH] IN BLOOD BY AUTOMATED COUNT: 12.7 % (ref 11.5–14.5)
EST. GFR  (NO RACE VARIABLE): 57 ML/MIN/1.73 M^2
GLUCOSE SERPL-MCNC: 100 MG/DL (ref 70–110)
HCT VFR BLD AUTO: 37.9 % (ref 37–48.5)
HGB BLD-MCNC: 11.7 G/DL (ref 12–16)
IMM GRANULOCYTES # BLD AUTO: 0.02 K/UL (ref 0–0.04)
IMM GRANULOCYTES NFR BLD AUTO: 0.4 % (ref 0–0.5)
LYMPHOCYTES # BLD AUTO: 0.9 K/UL (ref 1–4.8)
LYMPHOCYTES NFR BLD: 15.5 % (ref 18–48)
MAGNESIUM SERPL-MCNC: 1.9 MG/DL (ref 1.6–2.6)
MCH RBC QN AUTO: 31.6 PG (ref 27–31)
MCHC RBC AUTO-ENTMCNC: 30.9 G/DL (ref 32–36)
MCV RBC AUTO: 102 FL (ref 82–98)
MONOCYTES # BLD AUTO: 0.6 K/UL (ref 0.3–1)
MONOCYTES NFR BLD: 11.3 % (ref 4–15)
NEUTROPHILS # BLD AUTO: 3.8 K/UL (ref 1.8–7.7)
NEUTROPHILS NFR BLD: 68.1 % (ref 38–73)
NRBC BLD-RTO: 0 /100 WBC
PLATELET # BLD AUTO: 115 K/UL (ref 150–450)
PMV BLD AUTO: 11.9 FL (ref 9.2–12.9)
POTASSIUM SERPL-SCNC: 3.8 MMOL/L (ref 3.5–5.1)
PROT SERPL-MCNC: 6.1 G/DL (ref 6–8.4)
RBC # BLD AUTO: 3.7 M/UL (ref 4–5.4)
SODIUM SERPL-SCNC: 135 MMOL/L (ref 136–145)
VANCOMYCIN TROUGH SERPL-MCNC: 9.4 UG/ML (ref 10–22)
WBC # BLD AUTO: 5.6 K/UL (ref 3.9–12.7)

## 2023-09-01 PROCEDURE — 25000003 PHARM REV CODE 250: Performed by: FAMILY MEDICINE

## 2023-09-01 PROCEDURE — 94761 N-INVAS EAR/PLS OXIMETRY MLT: CPT

## 2023-09-01 PROCEDURE — 85025 COMPLETE CBC W/AUTO DIFF WBC: CPT | Performed by: FAMILY MEDICINE

## 2023-09-01 PROCEDURE — 83735 ASSAY OF MAGNESIUM: CPT | Performed by: FAMILY MEDICINE

## 2023-09-01 PROCEDURE — 99233 SBSQ HOSP IP/OBS HIGH 50: CPT | Mod: ,,, | Performed by: STUDENT IN AN ORGANIZED HEALTH CARE EDUCATION/TRAINING PROGRAM

## 2023-09-01 PROCEDURE — 36415 COLL VENOUS BLD VENIPUNCTURE: CPT | Performed by: FAMILY MEDICINE

## 2023-09-01 PROCEDURE — 99233 PR SUBSEQUENT HOSPITAL CARE,LEVL III: ICD-10-PCS | Mod: ,,, | Performed by: STUDENT IN AN ORGANIZED HEALTH CARE EDUCATION/TRAINING PROGRAM

## 2023-09-01 PROCEDURE — 36415 COLL VENOUS BLD VENIPUNCTURE: CPT | Performed by: STUDENT IN AN ORGANIZED HEALTH CARE EDUCATION/TRAINING PROGRAM

## 2023-09-01 PROCEDURE — 80053 COMPREHEN METABOLIC PANEL: CPT | Performed by: FAMILY MEDICINE

## 2023-09-01 PROCEDURE — 80202 ASSAY OF VANCOMYCIN: CPT | Performed by: STUDENT IN AN ORGANIZED HEALTH CARE EDUCATION/TRAINING PROGRAM

## 2023-09-01 PROCEDURE — 25000003 PHARM REV CODE 250: Performed by: STUDENT IN AN ORGANIZED HEALTH CARE EDUCATION/TRAINING PROGRAM

## 2023-09-01 PROCEDURE — 21400001 HC TELEMETRY ROOM

## 2023-09-01 PROCEDURE — 63600175 PHARM REV CODE 636 W HCPCS: Performed by: FAMILY MEDICINE

## 2023-09-01 PROCEDURE — 63600175 PHARM REV CODE 636 W HCPCS: Performed by: STUDENT IN AN ORGANIZED HEALTH CARE EDUCATION/TRAINING PROGRAM

## 2023-09-01 PROCEDURE — 25000003 PHARM REV CODE 250: Performed by: HOSPITALIST

## 2023-09-01 RX ORDER — CALCIUM CARBONATE 200(500)MG
500 TABLET,CHEWABLE ORAL 3 TIMES DAILY PRN
Status: DISCONTINUED | OUTPATIENT
Start: 2023-09-01 | End: 2023-09-04 | Stop reason: HOSPADM

## 2023-09-01 RX ORDER — POLYETHYLENE GLYCOL 3350 17 G/17G
17 POWDER, FOR SOLUTION ORAL DAILY
Status: DISCONTINUED | OUTPATIENT
Start: 2023-09-01 | End: 2023-09-04 | Stop reason: HOSPADM

## 2023-09-01 RX ADMIN — PIPERACILLIN AND TAZOBACTAM 3.38 G: 3; .375 INJECTION, POWDER, LYOPHILIZED, FOR SOLUTION INTRAVENOUS; PARENTERAL at 04:09

## 2023-09-01 RX ADMIN — ENOXAPARIN SODIUM 40 MG: 40 INJECTION SUBCUTANEOUS at 04:09

## 2023-09-01 RX ADMIN — CALCIUM CARBONATE (ANTACID) CHEW TAB 500 MG 500 MG: 500 CHEW TAB at 09:09

## 2023-09-01 RX ADMIN — TRAZODONE HYDROCHLORIDE 25 MG: 50 TABLET ORAL at 09:09

## 2023-09-01 RX ADMIN — PIPERACILLIN AND TAZOBACTAM 3.38 G: 3; .375 INJECTION, POWDER, LYOPHILIZED, FOR SOLUTION INTRAVENOUS; PARENTERAL at 10:09

## 2023-09-01 RX ADMIN — OXYCODONE HYDROCHLORIDE 5 MG: 5 TABLET ORAL at 10:09

## 2023-09-01 RX ADMIN — VENLAFAXINE HYDROCHLORIDE 75 MG: 37.5 CAPSULE, EXTENDED RELEASE ORAL at 08:09

## 2023-09-01 RX ADMIN — MUPIROCIN: 20 OINTMENT TOPICAL at 08:09

## 2023-09-01 RX ADMIN — OXYCODONE HYDROCHLORIDE AND ACETAMINOPHEN 1 TABLET: 5; 325 TABLET ORAL at 08:09

## 2023-09-01 RX ADMIN — MUPIROCIN: 20 OINTMENT TOPICAL at 09:09

## 2023-09-01 RX ADMIN — VANCOMYCIN HYDROCHLORIDE 1000 MG: 1 INJECTION, POWDER, LYOPHILIZED, FOR SOLUTION INTRAVENOUS at 01:09

## 2023-09-01 RX ADMIN — OXYCODONE HYDROCHLORIDE AND ACETAMINOPHEN 1 TABLET: 5; 325 TABLET ORAL at 09:09

## 2023-09-01 RX ADMIN — POLYETHYLENE GLYCOL 3350 17 G: 17 POWDER, FOR SOLUTION ORAL at 08:09

## 2023-09-01 RX ADMIN — PIPERACILLIN AND TAZOBACTAM 3.38 G: 3; .375 INJECTION, POWDER, LYOPHILIZED, FOR SOLUTION INTRAVENOUS; PARENTERAL at 09:09

## 2023-09-01 RX ADMIN — OXYCODONE HYDROCHLORIDE 5 MG: 5 TABLET ORAL at 04:09

## 2023-09-01 RX ADMIN — OXYCODONE HYDROCHLORIDE 5 MG: 5 TABLET ORAL at 07:09

## 2023-09-01 RX ADMIN — OXYCODONE HYDROCHLORIDE AND ACETAMINOPHEN 1 TABLET: 5; 325 TABLET ORAL at 01:09

## 2023-09-01 NOTE — PLAN OF CARE
Problem: Impaired Wound Healing  Goal: Optimal Wound Healing  Outcome: Ongoing, Progressing  Intervention: Promote Wound Healing  Flowsheets (Taken 9/1/2023 0926)  Sleep/Rest Enhancement: awakenings minimized  Activity Management: Up to bedside commode - L3     Plan of care reviewed with pt. KEVIN throughout shift. PRN pain medications provided as ordered. Regular diet. Hand elevated. Purewick in place. VSS.  Safety maintained. No complaints at this time.

## 2023-09-01 NOTE — PLAN OF CARE
Problem: Adult Inpatient Plan of Care  Goal: Plan of Care Review  Outcome: Ongoing, Progressing     Problem: Adult Inpatient Plan of Care  Goal: Optimal Comfort and Wellbeing  Outcome: Ongoing, Progressing     Problem: Pain Acute  Goal: Acceptable Pain Control and Functional Ability  Outcome: Ongoing, Progressing     Problem: Fatigue  Goal: Improved Activity Tolerance  Outcome: Ongoing, Progressing     Problem: Fall Injury Risk  Goal: Absence of Fall and Fall-Related Injury  Outcome: Ongoing, Progressing

## 2023-09-01 NOTE — ASSESSMENT & PLAN NOTE
2/2 cat bite  Tetanus shot in ED  Continue broad spectrum abx  MRI w/o abscess  Low threshold to consult ortho if hand stops improving  Artis borders of cellulitis  q6h neurovascular checks.  Updated pictures placed in chart

## 2023-09-01 NOTE — SUBJECTIVE & OBJECTIVE
Interval History: Hand showing significant improvement today. ROM improving. Redness decreased. Updated pictures placed in chart. Continue IV abx for 24-48h prior to switching to PO abx    Review of Systems   All other systems reviewed and are negative.    Objective:     Vital Signs (Most Recent):  Temp: 98.6 °F (37 °C) (09/01/23 0908)  Pulse: 74 (09/01/23 0917)  Resp: 18 (09/01/23 1059)  BP: (!) 117/58 (09/01/23 0908)  SpO2: 95 % (09/01/23 0917) Vital Signs (24h Range):  Temp:  [97.7 °F (36.5 °C)-98.6 °F (37 °C)] 98.6 °F (37 °C)  Pulse:  [72-82] 74  Resp:  [16-18] 18  SpO2:  [94 %-95 %] 95 %  BP: (117-143)/(58-73) 117/58     Weight: 67.5 kg (148 lb 13 oz)  Body mass index is 30.06 kg/m².    Intake/Output Summary (Last 24 hours) at 9/1/2023 1102  Last data filed at 9/1/2023 0914  Gross per 24 hour   Intake --   Output 650 ml   Net -650 ml         Physical Exam      Vitals reviewed  General: NAD, Well developed, Well Nourished  Head: NC/AT  Eyes: EOMI, JASE  Cardiovascular: Pulses intact distally, Regular Rate and rhythm  Pulmonary: Normal Respiratory Rate, No respiratory distress  Gi: Soft, Non-tender  Extremities: Warm, No edema present in lower extremities. Severe deformity of bilateral hands 2/2 arthritis. Right hand with significant swelling that has improved. Redness nearly resolved. Able to move fingers today much more than yesterday. Able to move wrist up and down. Good capillary refill. Sensation intact.  Skin: Warm, dry  Neuro: Alert, Oriented x3, No focal Deficit  Psych: Appropriate mood and affect    Significant Labs: All pertinent labs within the past 24 hours have been reviewed.    Significant Imaging: I have reviewed all pertinent imaging results/findings within the past 24 hours.

## 2023-09-01 NOTE — NURSING
Report received.  Assessment done.  VSS.  Awake and oriented.  Respirations even and unlabored.  C/O moderate pain to right hand.  Right hand is swollen, with mild redness noted.  Elevated on a pillow.  Multiple small dressing are in place.  External florentino catheter is in place.  Incontinence pad is clean and dry.  Repositioned for comfort.  Call light within reach.  Plan of care discussed.  Verbalized understanding.  See flowsheet for further assessment.

## 2023-09-01 NOTE — PROGRESS NOTES
[facility] Medicine  Progress Note    Patient Name: Mariela Solano  MRN: [id number]  Patient Class: IP- Inpatient   Admission Date: 8/30/2023  Length of Stay: 1 days  Attending Physician: Bryce Patel MD  Primary Care Provider: Eri, Primary Doctor        Subjective:     Principal Problem:Cellulitis of right hand        HPI:  81 yo female with PMH of liver disease, osteoarthritis presents to the ER with complaints of right hand swelling after a cat bite. Patient was bitten by her cat last night after trying to stop the cat and dog from attacking each other. Her hand initially felt fine but over the course of the night she developed more pain. This morning the hand was very swollen and she was having difficulty moving her fingers so came to the ER. In the ER, her WBC was normal. HR and BP normal. No hypotension. Lactic acid normal. The hand appeared severely swollen with streaking up the arm. She has had multiple joint replacements. Hospital team called for admission.      Overview/Hospital Course:  No notes on file    Interval History: Hand showing significant improvement today. ROM improving. Redness decreased. Updated pictures placed in chart. Continue IV abx for 24-48h prior to switching to PO abx    Review of Systems   All other systems reviewed and are negative.    Objective:     Vital Signs (Most Recent):  Temp: 98.6 °F (37 °C) (09/01/23 0908)  Pulse: 74 (09/01/23 0917)  Resp: 18 (09/01/23 1059)  BP: (!) 117/58 (09/01/23 0908)  SpO2: 95 % (09/01/23 0917) Vital Signs (24h Range):  Temp:  [97.7 °F (36.5 °C)-98.6 °F (37 °C)] 98.6 °F (37 °C)  Pulse:  [72-82] 74  Resp:  [16-18] 18  SpO2:  [94 %-95 %] 95 %  BP: (117-143)/(58-73) 117/58     Weight: 67.5 kg (148 lb 13 oz)  Body mass index is 30.06 kg/m².    Intake/Output Summary (Last 24 hours) at 9/1/2023 1102  Last data filed at 9/1/2023 0914  Gross per 24 hour   Intake --   Output 650 ml   Net -650 ml         Physical Exam      Vitals  reviewed  General: NAD, Well developed, Well Nourished  Head: NC/AT  Eyes: EOMI, JASE  Cardiovascular: Pulses intact distally, Regular Rate and rhythm  Pulmonary: Normal Respiratory Rate, No respiratory distress  Gi: Soft, Non-tender  Extremities: Warm, No edema present in lower extremities. Severe deformity of bilateral hands 2/2 arthritis. Right hand with significant swelling that has improved. Redness nearly resolved. Able to move fingers today much more than yesterday. Able to move wrist up and down. Good capillary refill. Sensation intact.  Skin: Warm, dry  Neuro: Alert, Oriented x3, No focal Deficit  Psych: Appropriate mood and affect    Significant Labs: All pertinent labs within the past 24 hours have been reviewed.    Significant Imaging: I have reviewed all pertinent imaging results/findings within the past 24 hours.      Assessment/Plan:      * Cellulitis of right hand  2/2 cat bite  Tetanus shot in ED  Continue broad spectrum abx  MRI w/o abscess  Low threshold to consult ortho if hand stops improving  Artis borders of cellulitis  q6h neurovascular checks.  Updated pictures placed in chart      VTE Risk Mitigation (From admission, onward)         Ordered     enoxaparin injection 40 mg  Daily         08/30/23 1328     IP VTE HIGH RISK PATIENT  Once         08/30/23 1328     Place sequential compression device  Until discontinued         08/30/23 1328                Discharge Planning   NICOLLE: 9/3/2023     Code Status: Full Code   Is the patient medically ready for discharge?:     Reason for patient still in hospital (select all that apply): Treatment  Discharge Plan A: Home with family   Discharge Delays: None known at this time              Bryce Patel MD  Department of [facility] Medicine   [facility] Intensive [facility]

## 2023-09-02 LAB
ALBUMIN SERPL BCP-MCNC: 2.4 G/DL (ref 3.5–5.2)
ALP SERPL-CCNC: 121 U/L (ref 55–135)
ALT SERPL W/O P-5'-P-CCNC: 14 U/L (ref 10–44)
ANION GAP SERPL CALC-SCNC: 9 MMOL/L (ref 8–16)
AST SERPL-CCNC: 18 U/L (ref 10–40)
BASOPHILS # BLD AUTO: 0.01 K/UL (ref 0–0.2)
BASOPHILS NFR BLD: 0.2 % (ref 0–1.9)
BILIRUB SERPL-MCNC: 0.9 MG/DL (ref 0.1–1)
BUN SERPL-MCNC: 12 MG/DL (ref 8–23)
CALCIUM SERPL-MCNC: 8.5 MG/DL (ref 8.7–10.5)
CHLORIDE SERPL-SCNC: 104 MMOL/L (ref 95–110)
CO2 SERPL-SCNC: 24 MMOL/L (ref 23–29)
CREAT SERPL-MCNC: 1 MG/DL (ref 0.5–1.4)
DIFFERENTIAL METHOD: ABNORMAL
EOSINOPHIL # BLD AUTO: 0.5 K/UL (ref 0–0.5)
EOSINOPHIL NFR BLD: 8.6 % (ref 0–8)
ERYTHROCYTE [DISTWIDTH] IN BLOOD BY AUTOMATED COUNT: 12.6 % (ref 11.5–14.5)
EST. GFR  (NO RACE VARIABLE): 57 ML/MIN/1.73 M^2
GLUCOSE SERPL-MCNC: 91 MG/DL (ref 70–110)
HCT VFR BLD AUTO: 33.7 % (ref 37–48.5)
HGB BLD-MCNC: 10.8 G/DL (ref 12–16)
IMM GRANULOCYTES # BLD AUTO: 0.02 K/UL (ref 0–0.04)
IMM GRANULOCYTES NFR BLD AUTO: 0.4 % (ref 0–0.5)
LYMPHOCYTES # BLD AUTO: 1.2 K/UL (ref 1–4.8)
LYMPHOCYTES NFR BLD: 22 % (ref 18–48)
MAGNESIUM SERPL-MCNC: 2 MG/DL (ref 1.6–2.6)
MCH RBC QN AUTO: 31 PG (ref 27–31)
MCHC RBC AUTO-ENTMCNC: 32 G/DL (ref 32–36)
MCV RBC AUTO: 97 FL (ref 82–98)
MONOCYTES # BLD AUTO: 0.8 K/UL (ref 0.3–1)
MONOCYTES NFR BLD: 14.3 % (ref 4–15)
NEUTROPHILS # BLD AUTO: 3 K/UL (ref 1.8–7.7)
NEUTROPHILS NFR BLD: 54.5 % (ref 38–73)
NRBC BLD-RTO: 0 /100 WBC
PLATELET # BLD AUTO: 133 K/UL (ref 150–450)
PMV BLD AUTO: 11.9 FL (ref 9.2–12.9)
POTASSIUM SERPL-SCNC: 3.8 MMOL/L (ref 3.5–5.1)
PROT SERPL-MCNC: 6 G/DL (ref 6–8.4)
RBC # BLD AUTO: 3.48 M/UL (ref 4–5.4)
SODIUM SERPL-SCNC: 137 MMOL/L (ref 136–145)
VANCOMYCIN SERPL-MCNC: 3.8 UG/ML
WBC # BLD AUTO: 5.58 K/UL (ref 3.9–12.7)

## 2023-09-02 PROCEDURE — 80053 COMPREHEN METABOLIC PANEL: CPT | Performed by: FAMILY MEDICINE

## 2023-09-02 PROCEDURE — 85025 COMPLETE CBC W/AUTO DIFF WBC: CPT | Performed by: FAMILY MEDICINE

## 2023-09-02 PROCEDURE — 25000003 PHARM REV CODE 250: Performed by: FAMILY MEDICINE

## 2023-09-02 PROCEDURE — 25000003 PHARM REV CODE 250: Performed by: STUDENT IN AN ORGANIZED HEALTH CARE EDUCATION/TRAINING PROGRAM

## 2023-09-02 PROCEDURE — 83735 ASSAY OF MAGNESIUM: CPT | Performed by: FAMILY MEDICINE

## 2023-09-02 PROCEDURE — 36415 COLL VENOUS BLD VENIPUNCTURE: CPT | Performed by: FAMILY MEDICINE

## 2023-09-02 PROCEDURE — 80202 ASSAY OF VANCOMYCIN: CPT | Performed by: STUDENT IN AN ORGANIZED HEALTH CARE EDUCATION/TRAINING PROGRAM

## 2023-09-02 PROCEDURE — 63600175 PHARM REV CODE 636 W HCPCS: Performed by: STUDENT IN AN ORGANIZED HEALTH CARE EDUCATION/TRAINING PROGRAM

## 2023-09-02 PROCEDURE — 63600175 PHARM REV CODE 636 W HCPCS: Performed by: FAMILY MEDICINE

## 2023-09-02 PROCEDURE — 21400001 HC TELEMETRY ROOM

## 2023-09-02 PROCEDURE — 36415 COLL VENOUS BLD VENIPUNCTURE: CPT | Performed by: STUDENT IN AN ORGANIZED HEALTH CARE EDUCATION/TRAINING PROGRAM

## 2023-09-02 PROCEDURE — 94761 N-INVAS EAR/PLS OXIMETRY MLT: CPT

## 2023-09-02 RX ORDER — URSODIOL 300 MG/1
300 CAPSULE ORAL DAILY
Status: DISCONTINUED | OUTPATIENT
Start: 2023-09-02 | End: 2023-09-04 | Stop reason: HOSPADM

## 2023-09-02 RX ADMIN — OXYCODONE HYDROCHLORIDE 5 MG: 5 TABLET ORAL at 01:09

## 2023-09-02 RX ADMIN — MUPIROCIN: 20 OINTMENT TOPICAL at 10:09

## 2023-09-02 RX ADMIN — OXYCODONE HYDROCHLORIDE AND ACETAMINOPHEN 1 TABLET: 5; 325 TABLET ORAL at 02:09

## 2023-09-02 RX ADMIN — VENLAFAXINE HYDROCHLORIDE 75 MG: 37.5 CAPSULE, EXTENDED RELEASE ORAL at 09:09

## 2023-09-02 RX ADMIN — PIPERACILLIN AND TAZOBACTAM 3.38 G: 3; .375 INJECTION, POWDER, LYOPHILIZED, FOR SOLUTION INTRAVENOUS; PARENTERAL at 09:09

## 2023-09-02 RX ADMIN — OXYCODONE HYDROCHLORIDE AND ACETAMINOPHEN 1 TABLET: 5; 325 TABLET ORAL at 06:09

## 2023-09-02 RX ADMIN — OXYCODONE HYDROCHLORIDE 5 MG: 5 TABLET ORAL at 09:09

## 2023-09-02 RX ADMIN — PIPERACILLIN AND TAZOBACTAM 3.38 G: 3; .375 INJECTION, POWDER, LYOPHILIZED, FOR SOLUTION INTRAVENOUS; PARENTERAL at 04:09

## 2023-09-02 RX ADMIN — TRAZODONE HYDROCHLORIDE 25 MG: 50 TABLET ORAL at 09:09

## 2023-09-02 RX ADMIN — VANCOMYCIN HYDROCHLORIDE 1000 MG: 1 INJECTION, POWDER, LYOPHILIZED, FOR SOLUTION INTRAVENOUS at 02:09

## 2023-09-02 RX ADMIN — ENOXAPARIN SODIUM 40 MG: 40 INJECTION SUBCUTANEOUS at 04:09

## 2023-09-02 RX ADMIN — OXYCODONE HYDROCHLORIDE 5 MG: 5 TABLET ORAL at 07:09

## 2023-09-02 RX ADMIN — POLYETHYLENE GLYCOL 3350 17 G: 17 POWDER, FOR SOLUTION ORAL at 09:09

## 2023-09-02 RX ADMIN — MUPIROCIN: 20 OINTMENT TOPICAL at 09:09

## 2023-09-02 RX ADMIN — OXYCODONE HYDROCHLORIDE AND ACETAMINOPHEN 1 TABLET: 5; 325 TABLET ORAL at 10:09

## 2023-09-02 RX ADMIN — URSODIOL 300 MG: 300 CAPSULE ORAL at 01:09

## 2023-09-02 NOTE — PLAN OF CARE
Problem: Adult Inpatient Plan of Care  Goal: Optimal Comfort and Wellbeing  Outcome: Ongoing, Progressing  Goal: Readiness for Transition of Care  Outcome: Ongoing, Progressing     Problem: Pain Acute  Goal: Acceptable Pain Control and Functional Ability  Outcome: Ongoing, Progressing     Problem: Infection  Goal: Absence of Infection Signs and Symptoms  Outcome: Ongoing, Progressing     Problem: Activity Intolerance  Goal: Enhanced Capacity and Energy  Outcome: Ongoing, Progressing     Problem: Fall Injury Risk  Goal: Absence of Fall and Fall-Related Injury  Outcome: Ongoing, Progressing     Problem: Skin Injury Risk Increased  Goal: Skin Health and Integrity  Outcome: Ongoing, Progressing

## 2023-09-02 NOTE — PLAN OF CARE
Problem: Adult Inpatient Plan of Care  Goal: Plan of Care Review  Outcome: Ongoing, Progressing  Goal: Patient-Specific Goal (Individualized)  Outcome: Ongoing, Progressing  Goal: Absence of Hospital-Acquired Illness or Injury  Outcome: Ongoing, Progressing  Goal: Optimal Comfort and Wellbeing  Outcome: Ongoing, Progressing  Intervention: Monitor Pain and Promote Comfort  Flowsheets (Taken 9/2/2023 0328)  Pain Management Interventions: medication offered  Goal: Readiness for Transition of Care  Outcome: Ongoing, Progressing     Problem: Pain Acute  Goal: Acceptable Pain Control and Functional Ability  Outcome: Ongoing, Progressing  Intervention: Prevent or Manage Pain  Flowsheets (Taken 9/2/2023 0328)  Sleep/Rest Enhancement: room darkened  Sensory Stimulation Regulation: lighting decreased  Bowel Elimination Promotion: privacy promoted  Medication Review/Management: medications reviewed     Problem: Fatigue  Goal: Improved Activity Tolerance  Outcome: Ongoing, Progressing     Problem: Impaired Wound Healing  Goal: Optimal Wound Healing  Outcome: Ongoing, Progressing  Intervention: Promote Wound Healing  Flowsheets (Taken 9/2/2023 0328)  Sleep/Rest Enhancement: room darkened  Activity Management: Rolling - L1  Pain Management Interventions: medication offered

## 2023-09-02 NOTE — PROGRESS NOTES
[facility] Medicine  Progress Note    Patient Name: Mariela Solano  MRN: [id number]  Patient Class: IP- Inpatient   Admission Date: 8/30/2023  Length of Stay: 2 days  Attending Physician: Ngozi Pederson  Primary Care Provider: Eri, Primary Doctor        Subjective:     Principal Problem:Cellulitis of right hand        HPI:  81 yo female with PMH of liver disease, osteoarthritis presents to the ER with complaints of right hand swelling after a cat bite. Patient was bitten by her cat last night after trying to stop the cat and dog from attacking each other. Her hand initially felt fine but over the course of the night she developed more pain. This morning the hand was very swollen and she was having difficulty moving her fingers so came to the ER. In the ER, her WBC was normal. HR and BP normal. No hypotension. Lactic acid normal. The hand appeared severely swollen with streaking up the arm. She has had multiple joint replacements. Hospital team called for admission.      Overview/Hospital Course:  No notes on file    Interval History: awake and alert, feels a lot better. Right hand swelling and redness, improving.   Blood rueyxdt-FJQR-radtwppl IV antibiotics for now  H/H down trending- monitor.       Review of Systems   Constitutional:  Positive for activity change.   Respiratory:  Negative for shortness of breath.    Gastrointestinal:  Negative for abdominal distention.   Genitourinary:  Negative for dysuria.   Musculoskeletal:  Negative for back pain.   Skin:  Positive for color change and wound. Negative for rash.   Neurological:  Negative for weakness.   Psychiatric/Behavioral:  Negative for agitation.      Objective:     Vital Signs (Most Recent):  Temp: 98 °F (36.7 °C) (09/02/23 0746)  Pulse: 67 (09/02/23 0746)  Resp: 16 (09/02/23 1000)  BP: (!) 121/58 (09/02/23 0746)  SpO2: (!) 94 % (09/02/23 0746) Vital Signs (24h Range):  Temp:  [98 °F (36.7 °C)-98.6 °F (37 °C)] 98 °F (36.7  "°C)  Pulse:  [67-87] 67  Resp:  [14-18] 16  SpO2:  [93 %-96 %] 94 %  BP: (101-149)/(53-65) 121/58     Weight: 67.5 kg (148 lb 13 oz)  Body mass index is 30.06 kg/m².    Intake/Output Summary (Last 24 hours) at 9/2/2023 1157  Last data filed at 9/2/2023 0449  Gross per 24 hour   Intake 1596.12 ml   Output 1700 ml   Net -103.88 ml         Physical Exam  Vitals reviewed.   Constitutional:       General: She is not in acute distress.     Appearance: She is not ill-appearing or toxic-appearing.   Cardiovascular:      Rate and Rhythm: Normal rate and regular rhythm.      Pulses: Normal pulses.      Heart sounds: No murmur heard.     No gallop.   Pulmonary:      Effort: Pulmonary effort is normal.      Breath sounds: No wheezing or rales.   Abdominal:      General: There is no distension.   Musculoskeletal:         General: Normal range of motion.      Cervical back: Normal range of motion.      Comments: Edema of the right hand and forearm. Erythema extending up the forearm improving   Skin:     Findings: Bruising and erythema present.   Neurological:      General: No focal deficit present.      Mental Status: She is alert.      Comments: Neurovascularly intact   Psychiatric:         Mood and Affect: Mood normal.             Significant Labs: A1C: No results for input(s): "HGBA1C" in the last 4320 hours.  ABGs: No results for input(s): "PH", "PCO2", "HCO3", "POCSATURATED", "BE", "TOTALHB", "COHB", "METHB", "O2HB", "POCFIO2", "PO2" in the last 48 hours.  Blood Culture: No results for input(s): "LABBLOO" in the last 48 hours.  CBC:   Recent Labs   Lab 09/01/23  0546 09/02/23  0700   WBC 5.60 5.58   HGB 11.7* 10.8*   HCT 37.9 33.7*   * 133*     CMP:   Recent Labs   Lab 09/01/23  0546 09/02/23  0700   * 137   K 3.8 3.8    104   CO2 19* 24    91   BUN 15 12   CREATININE 1.0 1.0   CALCIUM 8.4* 8.5*   PROT 6.1 6.0   ALBUMIN 2.6* 2.4*   BILITOT 1.0 0.9   ALKPHOS 116 121   AST 17 18   ALT 11 14   ANIONGAP " "12 9     Lactic Acid: No results for input(s): "LACTATE" in the last 48 hours.  Lipase: No results for input(s): "LIPASE" in the last 48 hours.  Lipid Panel: No results for input(s): "CHOL", "HDL", "LDLCALC", "TRIG", "CHOLHDL" in the last 48 hours.  Magnesium:   Recent Labs   Lab 09/01/23  0546 09/02/23  0700   MG 1.9 2.0     Troponin: No results for input(s): "TROPONINI", "TROPONINIHS" in the last 48 hours.  TSH: No results for input(s): "TSH" in the last 4320 hours.  Urine Culture: No results for input(s): "LABURIN" in the last 48 hours.  Urine Studies: No results for input(s): "COLORU", "APPEARANCEUA", "PHUR", "SPECGRAV", "PROTEINUA", "GLUCUA", "KETONESU", "BILIRUBINUA", "OCCULTUA", "NITRITE", "UROBILINOGEN", "LEUKOCYTESUR", "RBCUA", "WBCUA", "BACTERIA", "SQUAMEPITHEL", "HYALINECASTS" in the last 48 hours.    Invalid input(s): "WRIGHTSUR"    Significant Imaging: I have reviewed all pertinent imaging results/findings within the past 24 hours.      Assessment/Plan:      * Cellulitis of right hand  2/2 cat bite  Tetanus shot in ED  Continue broad spectrum abx  MRI w/o abscess  Low threshold to consult ortho if hand stops improving  Artis borders of cellulitis  q6h neurovascular checks.  Updated pictures placed in chart      VTE Risk Mitigation (From admission, onward)         Ordered     enoxaparin injection 40 mg  Daily         08/30/23 1328     IP VTE HIGH RISK PATIENT  Once         08/30/23 1328     Place sequential compression device  Until discontinued         08/30/23 1328                Discharge Planning   NICOLLE: 9/4/2023     Code Status: Full Code   Is the patient medically ready for discharge?:     Reason for patient still in hospital (select all that apply): Patient trending condition  Discharge Plan A: Home with family   Discharge Delays: None known at this time        Ngozi Pederson MD  Department of [facility] Medicine   [place] - Intensive Care  "

## 2023-09-02 NOTE — SUBJECTIVE & OBJECTIVE
Interval History: awake and alert, feels a lot better. Right hand swelling and redness, improving.   Blood pndchhr-SGZV-eusrgybd IV antibiotics for now  H/H down trending- monitor.       Review of Systems   Constitutional:  Positive for activity change.   Respiratory:  Negative for shortness of breath.    Gastrointestinal:  Negative for abdominal distention.   Genitourinary:  Negative for dysuria.   Musculoskeletal:  Negative for back pain.   Skin:  Positive for color change and wound. Negative for rash.   Neurological:  Negative for weakness.   Psychiatric/Behavioral:  Negative for agitation.      Objective:     Vital Signs (Most Recent):  Temp: 98 °F (36.7 °C) (09/02/23 0746)  Pulse: 67 (09/02/23 0746)  Resp: 16 (09/02/23 1000)  BP: (!) 121/58 (09/02/23 0746)  SpO2: (!) 94 % (09/02/23 0746) Vital Signs (24h Range):  Temp:  [98 °F (36.7 °C)-98.6 °F (37 °C)] 98 °F (36.7 °C)  Pulse:  [67-87] 67  Resp:  [14-18] 16  SpO2:  [93 %-96 %] 94 %  BP: (101-149)/(53-65) 121/58     Weight: 67.5 kg (148 lb 13 oz)  Body mass index is 30.06 kg/m².    Intake/Output Summary (Last 24 hours) at 9/2/2023 1157  Last data filed at 9/2/2023 0449  Gross per 24 hour   Intake 1596.12 ml   Output 1700 ml   Net -103.88 ml         Physical Exam  Vitals reviewed.   Constitutional:       General: She is not in acute distress.     Appearance: She is not ill-appearing or toxic-appearing.   Cardiovascular:      Rate and Rhythm: Normal rate and regular rhythm.      Pulses: Normal pulses.      Heart sounds: No murmur heard.     No gallop.   Pulmonary:      Effort: Pulmonary effort is normal.      Breath sounds: No wheezing or rales.   Abdominal:      General: There is no distension.   Musculoskeletal:         General: Normal range of motion.      Cervical back: Normal range of motion.      Comments: Edema of the right hand and forearm. Erythema extending up the forearm improving   Skin:     Findings: Bruising and erythema present.   Neurological:       "General: No focal deficit present.      Mental Status: She is alert.      Comments: Neurovascularly intact   Psychiatric:         Mood and Affect: Mood normal.             Significant Labs: A1C: No results for input(s): "HGBA1C" in the last 4320 hours.  ABGs: No results for input(s): "PH", "PCO2", "HCO3", "POCSATURATED", "BE", "TOTALHB", "COHB", "METHB", "O2HB", "POCFIO2", "PO2" in the last 48 hours.  Blood Culture: No results for input(s): "LABBLOO" in the last 48 hours.  CBC:   Recent Labs   Lab 09/01/23  0546 09/02/23  0700   WBC 5.60 5.58   HGB 11.7* 10.8*   HCT 37.9 33.7*   * 133*     CMP:   Recent Labs   Lab 09/01/23  0546 09/02/23  0700   * 137   K 3.8 3.8    104   CO2 19* 24    91   BUN 15 12   CREATININE 1.0 1.0   CALCIUM 8.4* 8.5*   PROT 6.1 6.0   ALBUMIN 2.6* 2.4*   BILITOT 1.0 0.9   ALKPHOS 116 121   AST 17 18   ALT 11 14   ANIONGAP 12 9     Lactic Acid: No results for input(s): "LACTATE" in the last 48 hours.  Lipase: No results for input(s): "LIPASE" in the last 48 hours.  Lipid Panel: No results for input(s): "CHOL", "HDL", "LDLCALC", "TRIG", "CHOLHDL" in the last 48 hours.  Magnesium:   Recent Labs   Lab 09/01/23  0546 09/02/23  0700   MG 1.9 2.0     Troponin: No results for input(s): "TROPONINI", "TROPONINIHS" in the last 48 hours.  TSH: No results for input(s): "TSH" in the last 4320 hours.  Urine Culture: No results for input(s): "LABURIN" in the last 48 hours.  Urine Studies: No results for input(s): "COLORU", "APPEARANCEUA", "PHUR", "SPECGRAV", "PROTEINUA", "GLUCUA", "KETONESU", "BILIRUBINUA", "OCCULTUA", "NITRITE", "UROBILINOGEN", "LEUKOCYTESUR", "RBCUA", "WBCUA", "BACTERIA", "SQUAMEPITHEL", "HYALINECASTS" in the last 48 hours.    Invalid input(s): "WRIGHTSUR"    Significant Imaging: I have reviewed all pertinent imaging results/findings within the past 24 hours.  "

## 2023-09-03 PROBLEM — I10 HYPERTENSION: Status: ACTIVE | Noted: 2023-09-03

## 2023-09-03 LAB
ALBUMIN SERPL BCP-MCNC: 2.3 G/DL (ref 3.5–5.2)
ALP SERPL-CCNC: 123 U/L (ref 55–135)
ALT SERPL W/O P-5'-P-CCNC: 18 U/L (ref 10–44)
ANION GAP SERPL CALC-SCNC: 9 MMOL/L (ref 8–16)
AST SERPL-CCNC: 25 U/L (ref 10–40)
BASOPHILS # BLD AUTO: 0.02 K/UL (ref 0–0.2)
BASOPHILS NFR BLD: 0.3 % (ref 0–1.9)
BILIRUB SERPL-MCNC: 0.8 MG/DL (ref 0.1–1)
BUN SERPL-MCNC: 10 MG/DL (ref 8–23)
CALCIUM SERPL-MCNC: 8.3 MG/DL (ref 8.7–10.5)
CHLORIDE SERPL-SCNC: 102 MMOL/L (ref 95–110)
CO2 SERPL-SCNC: 22 MMOL/L (ref 23–29)
CREAT SERPL-MCNC: 0.9 MG/DL (ref 0.5–1.4)
DIFFERENTIAL METHOD: ABNORMAL
EOSINOPHIL # BLD AUTO: 0.5 K/UL (ref 0–0.5)
EOSINOPHIL NFR BLD: 8.7 % (ref 0–8)
ERYTHROCYTE [DISTWIDTH] IN BLOOD BY AUTOMATED COUNT: 12.4 % (ref 11.5–14.5)
EST. GFR  (NO RACE VARIABLE): >60 ML/MIN/1.73 M^2
GLUCOSE SERPL-MCNC: 98 MG/DL (ref 70–110)
HCT VFR BLD AUTO: 31.5 % (ref 37–48.5)
HGB BLD-MCNC: 10.3 G/DL (ref 12–16)
IMM GRANULOCYTES # BLD AUTO: 0.02 K/UL (ref 0–0.04)
IMM GRANULOCYTES NFR BLD AUTO: 0.3 % (ref 0–0.5)
LYMPHOCYTES # BLD AUTO: 1.2 K/UL (ref 1–4.8)
LYMPHOCYTES NFR BLD: 19.1 % (ref 18–48)
MAGNESIUM SERPL-MCNC: 1.9 MG/DL (ref 1.6–2.6)
MCH RBC QN AUTO: 30.8 PG (ref 27–31)
MCHC RBC AUTO-ENTMCNC: 32.7 G/DL (ref 32–36)
MCV RBC AUTO: 94 FL (ref 82–98)
MONOCYTES # BLD AUTO: 0.8 K/UL (ref 0.3–1)
MONOCYTES NFR BLD: 13.5 % (ref 4–15)
NEUTROPHILS # BLD AUTO: 3.6 K/UL (ref 1.8–7.7)
NEUTROPHILS NFR BLD: 58.1 % (ref 38–73)
NRBC BLD-RTO: 0 /100 WBC
PHOSPHATE SERPL-MCNC: 2.7 MG/DL (ref 2.7–4.5)
PLATELET # BLD AUTO: 143 K/UL (ref 150–450)
PMV BLD AUTO: 11.6 FL (ref 9.2–12.9)
POTASSIUM SERPL-SCNC: 3.8 MMOL/L (ref 3.5–5.1)
PROT SERPL-MCNC: 5.8 G/DL (ref 6–8.4)
RBC # BLD AUTO: 3.34 M/UL (ref 4–5.4)
SODIUM SERPL-SCNC: 133 MMOL/L (ref 136–145)
VANCOMYCIN SERPL-MCNC: 12.6 UG/ML
WBC # BLD AUTO: 6.22 K/UL (ref 3.9–12.7)

## 2023-09-03 PROCEDURE — 25000003 PHARM REV CODE 250: Performed by: FAMILY MEDICINE

## 2023-09-03 PROCEDURE — 94761 N-INVAS EAR/PLS OXIMETRY MLT: CPT

## 2023-09-03 PROCEDURE — 21400001 HC TELEMETRY ROOM

## 2023-09-03 PROCEDURE — 80202 ASSAY OF VANCOMYCIN: CPT | Performed by: FAMILY MEDICINE

## 2023-09-03 PROCEDURE — 85025 COMPLETE CBC W/AUTO DIFF WBC: CPT | Performed by: HOSPITALIST

## 2023-09-03 PROCEDURE — 83735 ASSAY OF MAGNESIUM: CPT | Performed by: HOSPITALIST

## 2023-09-03 PROCEDURE — 63600175 PHARM REV CODE 636 W HCPCS: Performed by: FAMILY MEDICINE

## 2023-09-03 PROCEDURE — 84100 ASSAY OF PHOSPHORUS: CPT | Performed by: HOSPITALIST

## 2023-09-03 PROCEDURE — 80053 COMPREHEN METABOLIC PANEL: CPT | Performed by: HOSPITALIST

## 2023-09-03 PROCEDURE — 25000003 PHARM REV CODE 250: Performed by: STUDENT IN AN ORGANIZED HEALTH CARE EDUCATION/TRAINING PROGRAM

## 2023-09-03 RX ORDER — AMOXICILLIN AND CLAVULANATE POTASSIUM 875; 125 MG/1; MG/1
1 TABLET, FILM COATED ORAL EVERY 12 HOURS
Qty: 16 TABLET | Refills: 0 | OUTPATIENT
Start: 2023-09-03 | End: 2023-09-11

## 2023-09-03 RX ORDER — AMLODIPINE BESYLATE 5 MG/1
5 TABLET ORAL DAILY
Status: DISCONTINUED | OUTPATIENT
Start: 2023-09-03 | End: 2023-09-04 | Stop reason: HOSPADM

## 2023-09-03 RX ORDER — AMOXICILLIN AND CLAVULANATE POTASSIUM 875; 125 MG/1; MG/1
1 TABLET, FILM COATED ORAL EVERY 12 HOURS
Status: DISCONTINUED | OUTPATIENT
Start: 2023-09-03 | End: 2023-09-04 | Stop reason: HOSPADM

## 2023-09-03 RX ORDER — OXYCODONE HYDROCHLORIDE 5 MG/1
5 TABLET ORAL EVERY 6 HOURS PRN
Qty: 15 TABLET | Refills: 0 | OUTPATIENT
Start: 2023-09-03

## 2023-09-03 RX ORDER — AMLODIPINE BESYLATE 5 MG/1
5 TABLET ORAL DAILY
Qty: 30 TABLET | Refills: 11 | OUTPATIENT
Start: 2023-09-03 | End: 2024-09-02

## 2023-09-03 RX ORDER — POLYETHYLENE GLYCOL 3350 17 G/17G
17 POWDER, FOR SOLUTION ORAL DAILY
Qty: 30 EACH | Refills: 0 | OUTPATIENT
Start: 2023-09-03

## 2023-09-03 RX ADMIN — AMOXICILLIN AND CLAVULANATE POTASSIUM 1 TABLET: 875; 125 TABLET, FILM COATED ORAL at 08:09

## 2023-09-03 RX ADMIN — MUPIROCIN: 20 OINTMENT TOPICAL at 08:09

## 2023-09-03 RX ADMIN — AMLODIPINE BESYLATE 5 MG: 5 TABLET ORAL at 10:09

## 2023-09-03 RX ADMIN — VENLAFAXINE HYDROCHLORIDE 75 MG: 37.5 CAPSULE, EXTENDED RELEASE ORAL at 08:09

## 2023-09-03 RX ADMIN — URSODIOL 300 MG: 300 CAPSULE ORAL at 08:09

## 2023-09-03 RX ADMIN — OXYCODONE HYDROCHLORIDE AND ACETAMINOPHEN 1 TABLET: 5; 325 TABLET ORAL at 08:09

## 2023-09-03 RX ADMIN — VANCOMYCIN HYDROCHLORIDE 750 MG: 750 INJECTION, POWDER, LYOPHILIZED, FOR SOLUTION INTRAVENOUS at 05:09

## 2023-09-03 RX ADMIN — PIPERACILLIN AND TAZOBACTAM 3.38 G: 3; .375 INJECTION, POWDER, LYOPHILIZED, FOR SOLUTION INTRAVENOUS; PARENTERAL at 04:09

## 2023-09-03 RX ADMIN — ENOXAPARIN SODIUM 40 MG: 40 INJECTION SUBCUTANEOUS at 04:09

## 2023-09-03 RX ADMIN — TRAZODONE HYDROCHLORIDE 25 MG: 50 TABLET ORAL at 09:09

## 2023-09-03 RX ADMIN — AMOXICILLIN AND CLAVULANATE POTASSIUM 1 TABLET: 875; 125 TABLET, FILM COATED ORAL at 10:09

## 2023-09-03 RX ADMIN — OXYCODONE HYDROCHLORIDE AND ACETAMINOPHEN 1 TABLET: 5; 325 TABLET ORAL at 04:09

## 2023-09-03 RX ADMIN — OXYCODONE HYDROCHLORIDE 5 MG: 5 TABLET ORAL at 08:09

## 2023-09-03 RX ADMIN — POLYETHYLENE GLYCOL 3350 17 G: 17 POWDER, FOR SOLUTION ORAL at 08:09

## 2023-09-03 RX ADMIN — OXYCODONE HYDROCHLORIDE 5 MG: 5 TABLET ORAL at 10:09

## 2023-09-03 NOTE — PLAN OF CARE
Problem: Adult Inpatient Plan of Care  Goal: Plan of Care Review  Outcome: Ongoing, Progressing  Goal: Patient-Specific Goal (Individualized)  Outcome: Ongoing, Progressing  Goal: Absence of Hospital-Acquired Illness or Injury  Outcome: Ongoing, Progressing  Goal: Optimal Comfort and Wellbeing  Outcome: Ongoing, Progressing  Goal: Readiness for Transition of Care  Outcome: Ongoing, Progressing     Problem: Pain Acute  Goal: Acceptable Pain Control and Functional Ability  Outcome: Ongoing, Progressing     Problem: Infection  Goal: Absence of Infection Signs and Symptoms  Outcome: Ongoing, Progressing     Problem: Fatigue  Goal: Improved Activity Tolerance  Outcome: Ongoing, Progressing     Problem: Activity Intolerance  Goal: Enhanced Capacity and Energy  Outcome: Ongoing, Progressing     Problem: Fall Injury Risk  Goal: Absence of Fall and Fall-Related Injury  Outcome: Ongoing, Progressing     Problem: Skin Injury Risk Increased  Goal: Skin Health and Integrity  Outcome: Ongoing, Progressing     Problem: Impaired Wound Healing  Goal: Optimal Wound Healing  Outcome: Ongoing, Progressing

## 2023-09-03 NOTE — PROGRESS NOTES
Pharmacokinetic Assessment Follow Up: IV Vancomycin    Vancomycin serum concentration assessment(s):    The random level was drawn correctly and can be used to guide therapy at this time. The measurement is within the desired definitive target range of 10 to 20 mcg/mL.    Vancomycin Regimen Plan:  Administer 750mg dose    Re-dose when the random level is less than 20 mcg/mL, next level to be drawn at 17:30 on 9/3    Drug levels (last 3 results):  Recent Labs   Lab Result Units 09/01/23  1053 09/02/23  1200 09/03/23  0314   Vancomycin, Random ug/mL  --  3.8 12.6   Vancomycin-Trough ug/mL 9.4*  --   --        Pharmacy will continue to follow and monitor vancomycin.    Please contact pharmacy for questions regarding this assessment.    Thank you for the consult,   Sai Sierra       Patient brief summary:  Mariela Solano is a 82 y.o. female initiated on antimicrobial therapy with IV Vancomycin for treatment of bone/joint infection    The patient's current regimen is pulse dosing    Drug Allergies:   Review of patient's allergies indicates:   Allergen Reactions    Iodinated contrast media     Metrizamide Hives    Morphine Other (See Comments)     Took two days to recover    Oxycodone-acetaminophen Other (See Comments)     CONSTIPATION       Actual Body Weight:   67.5kg    Renal Function:   Estimated Creatinine Clearance: 44.3 mL/min (based on SCr of 0.9 mg/dL).,     Dialysis Method (if applicable):  N/A    CBC (last 72 hours):  Recent Labs   Lab Result Units 08/31/23  0511 09/01/23  0546 09/02/23  0700 09/03/23  0314   WBC K/uL 7.94 5.60 5.58 6.22   Hemoglobin g/dL 10.7* 11.7* 10.8* 10.3*   Hematocrit % 32.7* 37.9 33.7* 31.5*   Platelets K/uL 104* 115* 133* 143*   Gran % % 75.7* 68.1 54.5 58.1   Lymph % % 12.1* 15.5* 22.0 19.1   Mono % % 9.9 11.3 14.3 13.5   Eosinophil % % 1.6 4.3 8.6* 8.7*   Basophil % % 0.3 0.4 0.2 0.3   Differential Method  Automated Automated Automated Automated       Metabolic Panel (last 72  hours):  Recent Labs   Lab Result Units 08/31/23  0511 09/01/23  0546 09/02/23  0700 09/03/23  0314   Sodium mmol/L 136 135* 137 133*   Potassium mmol/L 3.6 3.8 3.8 3.8   Chloride mmol/L 102 104 104 102   CO2 mmol/L 22* 19* 24 22*   Glucose mg/dL 96 100 91 98   BUN mg/dL 21 15 12 10   Creatinine mg/dL 1.0 1.0 1.0 0.9   Albumin g/dL 2.9* 2.6* 2.4* 2.3*   Total Bilirubin mg/dL 1.6* 1.0 0.9 0.8   Alkaline Phosphatase U/L 112 116 121 123   AST U/L 17 17 18 25   ALT U/L 11 11 14 18   Magnesium mg/dL 1.8 1.9 2.0 1.9   Phosphorus mg/dL  --   --   --  2.7       Vancomycin Administrations:  vancomycin given in the last 96 hours                     vancomycin (VANCOCIN) 1,000 mg in dextrose 5 % (D5W) 250 mL IVPB (Vial-Mate) (mg) 1,000 mg New Bag 09/02/23 1452     1,000 mg New Bag 09/01/23 1301     1,000 mg New Bag 08/31/23 1343    vancomycin (VANCOCIN) 1,000 mg in dextrose 5 % (D5W) 250 mL IVPB (Vial-Mate) (mg) 1,000 mg New Bag 08/30/23 1100                    Microbiologic Results:  Microbiology Results (last 7 days)       Procedure Component Value Units Date/Time    Blood culture [[id number]] Collected: 08/30/23 1004    Order Status: Completed Specimen: Blood from Peripheral, Antecubital, Left Updated: 09/02/23 1812     Blood Culture, Routine No Growth to date      No Growth to date      No Growth to date      No Growth to date    Blood culture [[id number]] Collected: 08/30/23 1004    Order Status: Completed Specimen: Blood from Peripheral, Forearm, Left Updated: 09/02/23 1812     Blood Culture, Routine No Growth to date      No Growth to date      No Growth to date      No Growth to date

## 2023-09-03 NOTE — PLAN OF CARE
Problem: Adult Inpatient Plan of Care  Goal: Plan of Care Review  Outcome: Ongoing, Progressing  Goal: Patient-Specific Goal (Individualized)  Outcome: Ongoing, Progressing  Goal: Absence of Hospital-Acquired Illness or Injury  Outcome: Ongoing, Progressing  Goal: Optimal Comfort and Wellbeing  Outcome: Ongoing, Progressing  Intervention: Monitor Pain and Promote Comfort  Flowsheets (Taken 9/3/2023 0526)  Pain Management Interventions: medication offered  Goal: Readiness for Transition of Care  Outcome: Ongoing, Progressing     Problem: Pain Acute  Goal: Acceptable Pain Control and Functional Ability  Outcome: Ongoing, Progressing  Intervention: Develop Pain Management Plan  Flowsheets (Taken 9/3/2023 0526)  Pain Management Interventions: medication offered     Problem: Infection  Goal: Absence of Infection Signs and Symptoms  Outcome: Ongoing, Progressing     Problem: Activity Intolerance  Goal: Enhanced Capacity and Energy  Outcome: Ongoing, Progressing     Problem: Impaired Wound Healing  Goal: Optimal Wound Healing  Outcome: Ongoing, Progressing  Intervention: Promote Wound Healing  Flowsheets (Taken 9/3/2023 0526)  Oral Nutrition Promotion: medicated  Activity Management: Rolling - L1  Pain Management Interventions: medication offered

## 2023-09-03 NOTE — SUBJECTIVE & OBJECTIVE
Interval History: awake and alert, feels a lot better. Right hand swelling and redness, improving.   Blood wmddbut-VCYQ-sn IV antibiotics- deescalate to p.o.  H/H -stable   BP stain elevated-start Norvasc  Discharge today-patient requesting to hold discharge for today      Review of Systems   Constitutional:  Positive for activity change.   Respiratory:  Negative for shortness of breath.    Gastrointestinal:  Negative for abdominal distention.   Genitourinary:  Negative for dysuria.   Musculoskeletal:  Negative for back pain.   Skin:  Positive for color change and wound. Negative for rash.   Neurological:  Negative for weakness.   Psychiatric/Behavioral:  Negative for agitation.      Objective:     Vital Signs (Most Recent):  Temp: 97.9 °F (36.6 °C) (09/03/23 0833)  Pulse: 85 (09/03/23 0833)  Resp: 20 (09/03/23 0840)  BP: (!) 169/78 (09/03/23 0833)  SpO2: (!) 94 % (09/03/23 0833) Vital Signs (24h Range):  Temp:  [97.9 °F (36.6 °C)-99.4 °F (37.4 °C)] 97.9 °F (36.6 °C)  Pulse:  [72-90] 85  Resp:  [12-20] 20  SpO2:  [94 %-98 %] 94 %  BP: (129-169)/(63-78) 169/78     Weight: 67.5 kg (148 lb 13 oz)  Body mass index is 30.06 kg/m².    Intake/Output Summary (Last 24 hours) at 9/3/2023 0849  Last data filed at 9/3/2023 0634  Gross per 24 hour   Intake 1543.45 ml   Output 1850 ml   Net -306.55 ml           Physical Exam  Vitals reviewed.   Constitutional:       General: She is not in acute distress.     Appearance: She is not ill-appearing or toxic-appearing.   Cardiovascular:      Rate and Rhythm: Normal rate and regular rhythm.      Pulses: Normal pulses.      Heart sounds: No murmur heard.     No gallop.   Pulmonary:      Effort: Pulmonary effort is normal.      Breath sounds: No wheezing or rales.   Abdominal:      General: There is no distension.   Musculoskeletal:         General: Normal range of motion.      Cervical back: Normal range of motion.      Comments: Edema of the right hand and forearm. Erythema extending  "up the forearm improving   Skin:     Findings: Bruising and erythema present.   Neurological:      General: No focal deficit present.      Mental Status: She is alert.      Comments: Neurovascularly intact   Psychiatric:         Mood and Affect: Mood normal.             Significant Labs: A1C: No results for input(s): "HGBA1C" in the last 4320 hours.  ABGs: No results for input(s): "PH", "PCO2", "HCO3", "POCSATURATED", "BE", "TOTALHB", "COHB", "METHB", "O2HB", "POCFIO2", "PO2" in the last 48 hours.  Blood Culture: No results for input(s): "LABBLOO" in the last 48 hours.  CBC:   Recent Labs   Lab 09/02/23  0700 09/03/23  0314   WBC 5.58 6.22   HGB 10.8* 10.3*   HCT 33.7* 31.5*   * 143*       CMP:   Recent Labs   Lab 09/02/23  0700 09/03/23  0314    133*   K 3.8 3.8    102   CO2 24 22*   GLU 91 98   BUN 12 10   CREATININE 1.0 0.9   CALCIUM 8.5* 8.3*   PROT 6.0 5.8*   ALBUMIN 2.4* 2.3*   BILITOT 0.9 0.8   ALKPHOS 121 123   AST 18 25   ALT 14 18   ANIONGAP 9 9       Lactic Acid: No results for input(s): "LACTATE" in the last 48 hours.  Lipase: No results for input(s): "LIPASE" in the last 48 hours.  Lipid Panel: No results for input(s): "CHOL", "HDL", "LDLCALC", "TRIG", "CHOLHDL" in the last 48 hours.  Magnesium:   Recent Labs   Lab 09/02/23  0700 09/03/23  0314   MG 2.0 1.9       Troponin: No results for input(s): "TROPONINI", "TROPONINIHS" in the last 48 hours.  TSH: No results for input(s): "TSH" in the last 4320 hours.  Urine Culture: No results for input(s): "LABURIN" in the last 48 hours.  Urine Studies: No results for input(s): "COLORU", "APPEARANCEUA", "PHUR", "SPECGRAV", "PROTEINUA", "GLUCUA", "KETONESU", "BILIRUBINUA", "OCCULTUA", "NITRITE", "UROBILINOGEN", "LEUKOCYTESUR", "RBCUA", "WBCUA", "BACTERIA", "SQUAMEPITHEL", "HYALINECASTS" in the last 48 hours.    Invalid input(s): "WRIGHTSUR"    Significant Imaging: I have reviewed all pertinent imaging results/findings within the past 24 hours.  "

## 2023-09-03 NOTE — ASSESSMENT & PLAN NOTE
2/2 cat bite  Tetanus shot in ED  Continue broad spectrum abx- de-escalate   MRI w/o abscess  Low threshold to consult ortho if hand stops improving  Artis borders of cellulitis  q6h neurovascular checks.  Updated pictures placed in chart

## 2023-09-03 NOTE — PROGRESS NOTES
[facility] Medicine  Progress Note    Patient Name: Mariela Solano  MRN: [id number]  Patient Class: IP- Inpatient   Admission Date: 8/30/2023  Length of Stay: 3 days  Attending Physician: Ngozi Pederson  Primary Care Provider: Eri, Primary Doctor        Subjective:     Principal Problem:Cellulitis of right hand        HPI:  83 yo female with PMH of liver disease, osteoarthritis presents to the ER with complaints of right hand swelling after a cat bite. Patient was bitten by her cat last night after trying to stop the cat and dog from attacking each other. Her hand initially felt fine but over the course of the night she developed more pain. This morning the hand was very swollen and she was having difficulty moving her fingers so came to the ER. In the ER, her WBC was normal. HR and BP normal. No hypotension. Lactic acid normal. The hand appeared severely swollen with streaking up the arm. She has had multiple joint replacements. Hospital team called for admission.      Overview/Hospital Course:  No notes on file    Interval History: awake and alert, feels a lot better. Right hand swelling and redness, improving.   Blood [id number] IV antibiotics- deescalate to p.o.  H/H -stable   BP stain elevated-start Norvasc  Discharge today-patient requesting to hold discharge for today      Review of Systems   Constitutional:  Positive for activity change.   Respiratory:  Negative for shortness of breath.    Gastrointestinal:  Negative for abdominal distention.   Genitourinary:  Negative for dysuria.   Musculoskeletal:  Negative for back pain.   Skin:  Positive for color change and wound. Negative for rash.   Neurological:  Negative for weakness.   Psychiatric/Behavioral:  Negative for agitation.      Objective:     Vital Signs (Most Recent):  Temp: 97.9 °F (36.6 °C) (09/03/23 0833)  Pulse: 85 (09/03/23 0833)  Resp: 20 (09/03/23 0840)  BP: (!) 169/78 (09/03/23 0833)  SpO2: (!) 94 %  "(09/03/23 0833) Vital Signs (24h Range):  Temp:  [97.9 °F (36.6 °C)-99.4 °F (37.4 °C)] 97.9 °F (36.6 °C)  Pulse:  [72-90] 85  Resp:  [12-20] 20  SpO2:  [94 %-98 %] 94 %  BP: (129-169)/(63-78) 169/78     Weight: 67.5 kg (148 lb 13 oz)  Body mass index is 30.06 kg/m².    Intake/Output Summary (Last 24 hours) at 9/3/2023 0849  Last data filed at 9/3/2023 0634  Gross per 24 hour   Intake 1543.45 ml   Output 1850 ml   Net -306.55 ml           Physical Exam  Vitals reviewed.   Constitutional:       General: She is not in acute distress.     Appearance: She is not ill-appearing or toxic-appearing.   Cardiovascular:      Rate and Rhythm: Normal rate and regular rhythm.      Pulses: Normal pulses.      Heart sounds: No murmur heard.     No gallop.   Pulmonary:      Effort: Pulmonary effort is normal.      Breath sounds: No wheezing or rales.   Abdominal:      General: There is no distension.   Musculoskeletal:         General: Normal range of motion.      Cervical back: Normal range of motion.      Comments: Edema of the right hand and forearm. Erythema extending up the forearm improving   Skin:     Findings: Bruising and erythema present.   Neurological:      General: No focal deficit present.      Mental Status: She is alert.      Comments: Neurovascularly intact   Psychiatric:         Mood and Affect: Mood normal.             Significant Labs: A1C: No results for input(s): "HGBA1C" in the last 4320 hours.  ABGs: No results for input(s): "PH", "PCO2", "HCO3", "POCSATURATED", "BE", "TOTALHB", "COHB", "METHB", "O2HB", "POCFIO2", "PO2" in the last 48 hours.  Blood Culture: No results for input(s): "LABBLOO" in the last 48 hours.  CBC:   Recent Labs   Lab 09/02/23  0700 09/03/23  0314   WBC 5.58 6.22   HGB 10.8* 10.3*   HCT 33.7* 31.5*   * 143*       CMP:   Recent Labs   Lab 09/02/23  0700 09/03/23  0314    133*   K 3.8 3.8    102   CO2 24 22*   GLU 91 98   BUN 12 10   CREATININE 1.0 0.9   CALCIUM 8.5* 8.3* " "  PROT 6.0 5.8*   ALBUMIN 2.4* 2.3*   BILITOT 0.9 0.8   ALKPHOS 121 123   AST 18 25   ALT 14 18   ANIONGAP 9 9       Lactic Acid: No results for input(s): "LACTATE" in the last 48 hours.  Lipase: No results for input(s): "LIPASE" in the last 48 hours.  Lipid Panel: No results for input(s): "CHOL", "HDL", "LDLCALC", "TRIG", "CHOLHDL" in the last 48 hours.  Magnesium:   Recent Labs   Lab 09/02/23  0700 09/03/23  0314   MG 2.0 1.9       Troponin: No results for input(s): "TROPONINI", "TROPONINIHS" in the last 48 hours.  TSH: No results for input(s): "TSH" in the last 4320 hours.  Urine Culture: No results for input(s): "LABURIN" in the last 48 hours.  Urine Studies: No results for input(s): "COLORU", "APPEARANCEUA", "PHUR", "SPECGRAV", "PROTEINUA", "GLUCUA", "KETONESU", "BILIRUBINUA", "OCCULTUA", "NITRITE", "UROBILINOGEN", "LEUKOCYTESUR", "RBCUA", "WBCUA", "BACTERIA", "SQUAMEPITHEL", "HYALINECASTS" in the last 48 hours.    Invalid input(s): "WRIGHTSUR"    Significant Imaging: I have reviewed all pertinent imaging results/findings within the past 24 hours.      Assessment/Plan:      * Cellulitis of right hand  2/2 cat bite  Tetanus shot in ED  Continue broad spectrum abx- de-escalate   MRI w/o abscess  Low threshold to consult ortho if hand stops improving  Artis borders of cellulitis  q6h neurovascular checks.  Updated pictures placed in chart    Hypertension  Started Norvasc on 09/03        VTE Risk Mitigation (From admission, onward)         Ordered     enoxaparin injection 40 mg  Daily         08/30/23 1328     IP VTE HIGH RISK PATIENT  Once         08/30/23 1328     Place sequential compression device  Until discontinued         08/30/23 1328                Discharge Planning   NICOLLE: 9/3/2023     Code Status: Full Code   Is the patient medically ready for discharge?:     Reason for patient still in hospital (select all that apply): Patient trending condition  Discharge Plan A: Home with family   Discharge Delays: " None known at this time              Ngozi Pederson MD  Department of [facility] - Intensive Care

## 2023-09-04 VITALS
DIASTOLIC BLOOD PRESSURE: 94 MMHG | HEART RATE: 80 BPM | SYSTOLIC BLOOD PRESSURE: 171 MMHG | TEMPERATURE: 97 F | HEIGHT: 59 IN | BODY MASS INDEX: 30 KG/M2 | OXYGEN SATURATION: 96 % | RESPIRATION RATE: 16 BRPM | WEIGHT: 148.81 LBS

## 2023-09-04 LAB
BACTERIA BLD CULT: NORMAL
BACTERIA BLD CULT: NORMAL

## 2023-09-04 PROCEDURE — 25000003 PHARM REV CODE 250: Performed by: FAMILY MEDICINE

## 2023-09-04 RX ORDER — OXYCODONE HYDROCHLORIDE 5 MG/1
5 CAPSULE ORAL EVERY 6 HOURS PRN
Qty: 12 CAPSULE | Refills: 0 | Status: SHIPPED | OUTPATIENT
Start: 2023-09-04 | End: 2023-09-04

## 2023-09-04 RX ORDER — AMOXICILLIN AND CLAVULANATE POTASSIUM 875; 125 MG/1; MG/1
1 TABLET, FILM COATED ORAL EVERY 12 HOURS
Qty: 18 TABLET | Refills: 0 | Status: SHIPPED | OUTPATIENT
Start: 2023-09-04 | End: 2023-09-13

## 2023-09-04 RX ORDER — OXYCODONE AND ACETAMINOPHEN 5; 325 MG/1; MG/1
1 TABLET ORAL EVERY 6 HOURS PRN
Qty: 12 TABLET | Refills: 0 | Status: SHIPPED | OUTPATIENT
Start: 2023-09-04 | End: 2023-09-07

## 2023-09-04 RX ADMIN — MUPIROCIN: 20 OINTMENT TOPICAL at 09:09

## 2023-09-04 RX ADMIN — OXYCODONE HYDROCHLORIDE AND ACETAMINOPHEN 1 TABLET: 5; 325 TABLET ORAL at 01:09

## 2023-09-04 RX ADMIN — VENLAFAXINE HYDROCHLORIDE 75 MG: 37.5 CAPSULE, EXTENDED RELEASE ORAL at 09:09

## 2023-09-04 RX ADMIN — URSODIOL 300 MG: 300 CAPSULE ORAL at 09:09

## 2023-09-04 RX ADMIN — OXYCODONE HYDROCHLORIDE 5 MG: 5 TABLET ORAL at 09:09

## 2023-09-04 RX ADMIN — AMOXICILLIN AND CLAVULANATE POTASSIUM 1 TABLET: 875; 125 TABLET, FILM COATED ORAL at 09:09

## 2023-09-04 RX ADMIN — AMLODIPINE BESYLATE 5 MG: 5 TABLET ORAL at 09:09

## 2023-09-04 NOTE — PLAN OF CARE
Problem: Adult Inpatient Plan of Care  Goal: Plan of Care Review  Outcome: Ongoing, Progressing  Goal: Patient-Specific Goal (Individualized)  Outcome: Ongoing, Progressing  Goal: Absence of Hospital-Acquired Illness or Injury  Outcome: Ongoing, Progressing  Goal: Optimal Comfort and Wellbeing  Outcome: Ongoing, Progressing  Goal: Readiness for Transition of Care  Outcome: Ongoing, Progressing     Problem: Pain Acute  Goal: Acceptable Pain Control and Functional Ability  Outcome: Ongoing, Progressing     Problem: Infection  Goal: Absence of Infection Signs and Symptoms  Outcome: Ongoing, Progressing     Problem: Fatigue  Goal: Improved Activity Tolerance  Outcome: Ongoing, Progressing     Problem: Activity Intolerance  Goal: Enhanced Capacity and Energy  Outcome: Ongoing, Progressing     Problem: Fall Injury Risk  Goal: Absence of Fall and Fall-Related Injury  Outcome: Ongoing, Progressing     Problem: Skin Injury Risk Increased  Goal: Skin Health and Integrity  Outcome: Ongoing, Progressing     Problem: Impaired Wound Healing  Goal: Optimal Wound Healing  Outcome: Ongoing, Progressing   POC reviewed at bedside. Questions and concerns addressed. VSS. Placed bed in low and locked position. Call light within reach. Side rails up x2. Instructed to call for any needs. Verbalized understanding of all instructions. Frequent rounds.

## 2023-09-05 NOTE — PLAN OF CARE
[facility] - Intensive Care  Discharge Final Note    Primary Care Provider: Eri Primary Doctor    Expected Discharge Date: 9/4/2023  Patient does not have a PCP listed. She was provided information on when to follow up with PCP and establishing care with a PCP. No other needs at this time.  Final Discharge Note (most recent)       Final Note - 09/05/23 0811          Final Note    Assessment Type Final Discharge Note     Anticipated Discharge Disposition Home or Self Care     What phone number can be called within the next 1-3 days to see how you are doing after discharge? [phone]     Hospital Resources/Appts/Education Provided Provided patient/caregiver with written discharge plan information        Post-Acute Status    Discharge Delays None known at this time                     Important Message from Medicare  Important Message from Medicare regarding Discharge Appeal Rights: Given to patient/caregiver, Explained to patient/caregiver, Signed/date by patient/caregiver     Date IMM was signed: 08/31/23  Time IMM was signed: 1619    Contact Info       Eri Primary Doctor   Relationship: PCP - General        Next Steps: Follow up in 1 week(s)

## 2023-09-06 NOTE — DISCHARGE SUMMARY
[facility] Medicine  Discharge Summary      Patient Name: Mariela Solano  MRN: [id number]  PRANEETH: [id number]  Patient Class: IP- Inpatient  Admission Date: 8/30/2023  Hospital Length of Stay: 4 days  Discharge Date and Time: 9/4/2023 12:15 PM  Attending Physician: Eri att. providers found   Discharging Provider: Bryce Patel MD  Primary Care Provider: Eri, Primary Doctor    Primary Care Team: Networked reference to record PCT     HPI:   81 yo female with PMH of liver disease, osteoarthritis presents to the ER with complaints of right hand swelling after a cat bite. Patient was bitten by her cat last night after trying to stop the cat and dog from attacking each other. Her hand initially felt fine but over the course of the night she developed more pain. This morning the hand was very swollen and she was having difficulty moving her fingers so came to the ER. In the ER, her WBC was normal. HR and BP normal. No hypotension. Lactic acid normal. The hand appeared severely swollen with streaking up the arm. She has had multiple joint replacements. Hospital team called for admission.      * No surgery found *      Hospital Course:   Patient admitted for right hand cellulitis following cat bite at home. Given tetanus shot and started on IV abx. MRI obtained which showed no abscess. Discussed case with ortho who did not see patient but stated if no abscess present likely okay to continue with IV abx but low threshold for consultation if hand stops improving. Patient very resistant to any surgical intervention given age and her  had negative experience with anesthesia so she requested conservative management as long as possible. Continued abx, neurovascular checks, hand elevation. Hand improved markedly during time admitted and patient eventually able to be switched to PO abx. ROM returned. Redness resolved. No fever or signs of sepsis. Monitored for 24h on PO abx to ensure continued  resolution of hand. Patient medically clear for discharge. Provided discharge instructions and return precautions.    Patient called hospital and asked that I call her and speak to her about antibiotic on 9/5. I personally called patient. Patient stated she was having 8-9 episodes of diarrhea in the 24h since discharge. I advised her that she should consider coming to ED for cdiff testing. She stated that she was going to start taking an antacid with the abx to see if this helps. She stated that if symptoms do not improve in next 24h she will come in and be checked out.        Goals of Care Treatment Preferences:  Code Status: Full Code      Consults:     No new Assessment & Plan notes have been filed under this hospital service since the last note was generated.  Service: Hospital Medicine    Final Active Diagnoses:    Diagnosis Date Noted POA    PRINCIPAL PROBLEM:  Cellulitis of right hand [L03.113] 08/31/2023 Yes    Hypertension [I10] 09/03/2023 Yes      Problems Resolved During this Admission:    Diagnosis Date Noted Date Resolved POA    Cat bite of hand [S61.459A, W55.01XA] 08/30/2023 08/31/2023 Yes       Discharged Condition: good    Disposition: Home or Self Care    Follow Up:   Follow-up Information     No, Primary Doctor Follow up in 1 week(s).                     Patient Instructions:      Diet Cardiac     Diet Adult Regular     Notify your health care provider if you experience any of the following:  temperature >100.4     Notify your health care provider if you experience any of the following:  severe uncontrolled pain     Change dressing (specify)   Order Comments: Dressing change: 1 times per day using Tegaderm and dry gauze. Do not submerge in water. Okay for shower     Activity as tolerated     Activity as tolerated       Significant Diagnostic Studies:   Recent Results (from the past 168 hour(s))   Blood culture    Collection Time: 08/30/23 10:04 AM    Specimen: Peripheral, Forearm, Left; Blood    Result Value Ref Range    Blood Culture, Routine No growth after 5 days.    Blood culture    Collection Time: 08/30/23 10:04 AM    Specimen: Peripheral, Antecubital, Left; Blood   Result Value Ref Range    Blood Culture, Routine No growth after 5 days.    CBC Auto Differential    Collection Time: 08/30/23 10:04 AM   Result Value Ref Range    WBC 9.16 3.90 - 12.70 K/uL    RBC 3.62 (L) 4.00 - 5.40 M/uL    Hemoglobin 11.3 (L) 12.0 - 16.0 g/dL    Hematocrit 34.4 (L) 37.0 - 48.5 %    MCV 95 82 - 98 fL    MCH 31.2 (H) 27.0 - 31.0 pg    MCHC 32.8 32.0 - 36.0 g/dL    RDW 12.4 11.5 - 14.5 %    Platelets 140 (L) 150 - 450 K/uL    MPV 12.0 9.2 - 12.9 fL    Immature Granulocytes 0.3 0.0 - 0.5 %    Gran # (ANC) 7.3 1.8 - 7.7 K/uL    Immature Grans (Abs) 0.03 0.00 - 0.04 K/uL    Lymph # 0.9 (L) 1.0 - 4.8 K/uL    Mono # 0.9 0.3 - 1.0 K/uL    Eos # 0.0 0.0 - 0.5 K/uL    Baso # 0.02 0.00 - 0.20 K/uL    nRBC 0 0 /100 WBC    Gran % 80.0 (H) 38.0 - 73.0 %    Lymph % 9.4 (L) 18.0 - 48.0 %    Mono % 9.7 4.0 - 15.0 %    Eosinophil % 0.4 0.0 - 8.0 %    Basophil % 0.2 0.0 - 1.9 %    Differential Method Automated    Lactic Acid, Plasma    Collection Time: 08/30/23 10:04 AM   Result Value Ref Range    Lactate (Lactic Acid) 0.8 0.5 - 2.2 mmol/L   Comprehensive Metabolic Panel    Collection Time: 08/30/23 11:30 AM   Result Value Ref Range    Sodium 135 (L) 136 - 145 mmol/L    Potassium 3.8 3.5 - 5.1 mmol/L    Chloride 101 95 - 110 mmol/L    CO2 23 23 - 29 mmol/L    Glucose 150 (H) 70 - 110 mg/dL    BUN 26 (H) 8 - 23 mg/dL    Creatinine 1.0 0.5 - 1.4 mg/dL    Calcium 8.8 8.7 - 10.5 mg/dL    Total Protein 6.5 6.0 - 8.4 g/dL    Albumin 3.3 (L) 3.5 - 5.2 g/dL    Total Bilirubin 1.1 (H) 0.1 - 1.0 mg/dL    Alkaline Phosphatase 127 55 - 135 U/L    AST 23 10 - 40 U/L    ALT 14 10 - 44 U/L    eGFR 57.0 (A) >60 mL/min/1.73 m^2    Anion Gap 11 8 - 16 mmol/L   C-Reactive Protein    Collection Time: 08/30/23 11:30 AM   Result Value Ref Range    CRP 27.5  (H) 0.0 - 8.2 mg/L   Comprehensive Metabolic Panel (CMP)    Collection Time: 08/31/23  5:11 AM   Result Value Ref Range    Sodium 136 136 - 145 mmol/L    Potassium 3.6 3.5 - 5.1 mmol/L    Chloride 102 95 - 110 mmol/L    CO2 22 (L) 23 - 29 mmol/L    Glucose 96 70 - 110 mg/dL    BUN 21 8 - 23 mg/dL    Creatinine 1.0 0.5 - 1.4 mg/dL    Calcium 8.6 (L) 8.7 - 10.5 mg/dL    Total Protein 6.1 6.0 - 8.4 g/dL    Albumin 2.9 (L) 3.5 - 5.2 g/dL    Total Bilirubin 1.6 (H) 0.1 - 1.0 mg/dL    Alkaline Phosphatase 112 55 - 135 U/L    AST 17 10 - 40 U/L    ALT 11 10 - 44 U/L    eGFR 57.0 (A) >60 mL/min/1.73 m^2    Anion Gap 12 8 - 16 mmol/L   Magnesium    Collection Time: 08/31/23  5:11 AM   Result Value Ref Range    Magnesium 1.8 1.6 - 2.6 mg/dL   CBC with Automated Differential    Collection Time: 08/31/23  5:11 AM   Result Value Ref Range    WBC 7.94 3.90 - 12.70 K/uL    RBC 3.44 (L) 4.00 - 5.40 M/uL    Hemoglobin 10.7 (L) 12.0 - 16.0 g/dL    Hematocrit 32.7 (L) 37.0 - 48.5 %    MCV 95 82 - 98 fL    MCH 31.1 (H) 27.0 - 31.0 pg    MCHC 32.7 32.0 - 36.0 g/dL    RDW 12.8 11.5 - 14.5 %    Platelets 104 (L) 150 - 450 K/uL    MPV 12.3 9.2 - 12.9 fL    Immature Granulocytes 0.4 0.0 - 0.5 %    Gran # (ANC) 6.0 1.8 - 7.7 K/uL    Immature Grans (Abs) 0.03 0.00 - 0.04 K/uL    Lymph # 1.0 1.0 - 4.8 K/uL    Mono # 0.8 0.3 - 1.0 K/uL    Eos # 0.1 0.0 - 0.5 K/uL    Baso # 0.02 0.00 - 0.20 K/uL    nRBC 0 0 /100 WBC    Gran % 75.7 (H) 38.0 - 73.0 %    Lymph % 12.1 (L) 18.0 - 48.0 %    Mono % 9.9 4.0 - 15.0 %    Eosinophil % 1.6 0.0 - 8.0 %    Basophil % 0.3 0.0 - 1.9 %    Differential Method Automated    Comprehensive Metabolic Panel (CMP)    Collection Time: 09/01/23  5:46 AM   Result Value Ref Range    Sodium 135 (L) 136 - 145 mmol/L    Potassium 3.8 3.5 - 5.1 mmol/L    Chloride 104 95 - 110 mmol/L    CO2 19 (L) 23 - 29 mmol/L    Glucose 100 70 - 110 mg/dL    BUN 15 8 - 23 mg/dL    Creatinine 1.0 0.5 - 1.4 mg/dL    Calcium 8.4 (L) 8.7 - 10.5  mg/dL    Total Protein 6.1 6.0 - 8.4 g/dL    Albumin 2.6 (L) 3.5 - 5.2 g/dL    Total Bilirubin 1.0 0.1 - 1.0 mg/dL    Alkaline Phosphatase 116 55 - 135 U/L    AST 17 10 - 40 U/L    ALT 11 10 - 44 U/L    eGFR 57.0 (A) >60 mL/min/1.73 m^2    Anion Gap 12 8 - 16 mmol/L   Magnesium    Collection Time: 09/01/23  5:46 AM   Result Value Ref Range    Magnesium 1.9 1.6 - 2.6 mg/dL   CBC with Automated Differential    Collection Time: 09/01/23  5:46 AM   Result Value Ref Range    WBC 5.60 3.90 - 12.70 K/uL    RBC 3.70 (L) 4.00 - 5.40 M/uL    Hemoglobin 11.7 (L) 12.0 - 16.0 g/dL    Hematocrit 37.9 37.0 - 48.5 %     (H) 82 - 98 fL    MCH 31.6 (H) 27.0 - 31.0 pg    MCHC 30.9 (L) 32.0 - 36.0 g/dL    RDW 12.7 11.5 - 14.5 %    Platelets 115 (L) 150 - 450 K/uL    MPV 11.9 9.2 - 12.9 fL    Immature Granulocytes 0.4 0.0 - 0.5 %    Gran # (ANC) 3.8 1.8 - 7.7 K/uL    Immature Grans (Abs) 0.02 0.00 - 0.04 K/uL    Lymph # 0.9 (L) 1.0 - 4.8 K/uL    Mono # 0.6 0.3 - 1.0 K/uL    Eos # 0.2 0.0 - 0.5 K/uL    Baso # 0.02 0.00 - 0.20 K/uL    nRBC 0 0 /100 WBC    Gran % 68.1 38.0 - 73.0 %    Lymph % 15.5 (L) 18.0 - 48.0 %    Mono % 11.3 4.0 - 15.0 %    Eosinophil % 4.3 0.0 - 8.0 %    Basophil % 0.4 0.0 - 1.9 %    Differential Method Automated    VANCOMYCIN, TROUGH    Collection Time: 09/01/23 10:53 AM   Result Value Ref Range    Vancomycin-Trough 9.4 (L) 10.0 - 22.0 ug/mL   Comprehensive Metabolic Panel (CMP)    Collection Time: 09/02/23  7:00 AM   Result Value Ref Range    Sodium 137 136 - 145 mmol/L    Potassium 3.8 3.5 - 5.1 mmol/L    Chloride 104 95 - 110 mmol/L    CO2 24 23 - 29 mmol/L    Glucose 91 70 - 110 mg/dL    BUN 12 8 - 23 mg/dL    Creatinine 1.0 0.5 - 1.4 mg/dL    Calcium 8.5 (L) 8.7 - 10.5 mg/dL    Total Protein 6.0 6.0 - 8.4 g/dL    Albumin 2.4 (L) 3.5 - 5.2 g/dL    Total Bilirubin 0.9 0.1 - 1.0 mg/dL    Alkaline Phosphatase 121 55 - 135 U/L    AST 18 10 - 40 U/L    ALT 14 10 - 44 U/L    eGFR 57.0 (A) >60 mL/min/1.73 m^2     Anion Gap 9 8 - 16 mmol/L   Magnesium    Collection Time: 09/02/23  7:00 AM   Result Value Ref Range    Magnesium 2.0 1.6 - 2.6 mg/dL   CBC with Automated Differential    Collection Time: 09/02/23  7:00 AM   Result Value Ref Range    WBC 5.58 3.90 - 12.70 K/uL    RBC 3.48 (L) 4.00 - 5.40 M/uL    Hemoglobin 10.8 (L) 12.0 - 16.0 g/dL    Hematocrit 33.7 (L) 37.0 - 48.5 %    MCV 97 82 - 98 fL    MCH 31.0 27.0 - 31.0 pg    MCHC 32.0 32.0 - 36.0 g/dL    RDW 12.6 11.5 - 14.5 %    Platelets 133 (L) 150 - 450 K/uL    MPV 11.9 9.2 - 12.9 fL    Immature Granulocytes 0.4 0.0 - 0.5 %    Gran # (ANC) 3.0 1.8 - 7.7 K/uL    Immature Grans (Abs) 0.02 0.00 - 0.04 K/uL    Lymph # 1.2 1.0 - 4.8 K/uL    Mono # 0.8 0.3 - 1.0 K/uL    Eos # 0.5 0.0 - 0.5 K/uL    Baso # 0.01 0.00 - 0.20 K/uL    nRBC 0 0 /100 WBC    Gran % 54.5 38.0 - 73.0 %    Lymph % 22.0 18.0 - 48.0 %    Mono % 14.3 4.0 - 15.0 %    Eosinophil % 8.6 (H) 0.0 - 8.0 %    Basophil % 0.2 0.0 - 1.9 %    Differential Method Automated    Vancomycin, random    Collection Time: 09/02/23 12:00 PM   Result Value Ref Range    Vancomycin, Random 3.8 ug/mL   Vancomycin, random    Collection Time: 09/03/23  3:14 AM   Result Value Ref Range    Vancomycin, Random 12.6 ug/mL   CBC auto differential    Collection Time: 09/03/23  3:14 AM   Result Value Ref Range    WBC 6.22 3.90 - 12.70 K/uL    RBC 3.34 (L) 4.00 - 5.40 M/uL    Hemoglobin 10.3 (L) 12.0 - 16.0 g/dL    Hematocrit 31.5 (L) 37.0 - 48.5 %    MCV 94 82 - 98 fL    MCH 30.8 27.0 - 31.0 pg    MCHC 32.7 32.0 - 36.0 g/dL    RDW 12.4 11.5 - 14.5 %    Platelets 143 (L) 150 - 450 K/uL    MPV 11.6 9.2 - 12.9 fL    Immature Granulocytes 0.3 0.0 - 0.5 %    Gran # (ANC) 3.6 1.8 - 7.7 K/uL    Immature Grans (Abs) 0.02 0.00 - 0.04 K/uL    Lymph # 1.2 1.0 - 4.8 K/uL    Mono # 0.8 0.3 - 1.0 K/uL    Eos # 0.5 0.0 - 0.5 K/uL    Baso # 0.02 0.00 - 0.20 K/uL    nRBC 0 0 /100 WBC    Gran % 58.1 38.0 - 73.0 %    Lymph % 19.1 18.0 - 48.0 %    Mono % 13.5  4.0 - 15.0 %    Eosinophil % 8.7 (H) 0.0 - 8.0 %    Basophil % 0.3 0.0 - 1.9 %    Differential Method Automated    Comprehensive metabolic panel    Collection Time: 09/03/23  3:14 AM   Result Value Ref Range    Sodium 133 (L) 136 - 145 mmol/L    Potassium 3.8 3.5 - 5.1 mmol/L    Chloride 102 95 - 110 mmol/L    CO2 22 (L) 23 - 29 mmol/L    Glucose 98 70 - 110 mg/dL    BUN 10 8 - 23 mg/dL    Creatinine 0.9 0.5 - 1.4 mg/dL    Calcium 8.3 (L) 8.7 - 10.5 mg/dL    Total Protein 5.8 (L) 6.0 - 8.4 g/dL    Albumin 2.3 (L) 3.5 - 5.2 g/dL    Total Bilirubin 0.8 0.1 - 1.0 mg/dL    Alkaline Phosphatase 123 55 - 135 U/L    AST 25 10 - 40 U/L    ALT 18 10 - 44 U/L    eGFR >60.0 >60 mL/min/1.73 m^2    Anion Gap 9 8 - 16 mmol/L   Magnesium    Collection Time: 09/03/23  3:14 AM   Result Value Ref Range    Magnesium 1.9 1.6 - 2.6 mg/dL   Phosphorus    Collection Time: 09/03/23  3:14 AM   Result Value Ref Range    Phosphorus 2.7 2.7 - 4.5 mg/dL     Microbiology Results (last 7 days)     ** No results found for the last 168 hours. **            Pending Diagnostic Studies:     None         Medications:  Reconciled Home Medications:      Medication List      START taking these medications    amoxicillin-clavulanate 875-125mg 875-125 mg per tablet  Commonly known as: AUGMENTIN  Take 1 tablet by mouth every 12 (twelve) hours. for 9 days        CONTINUE taking these medications    cetirizine 10 MG tablet  Commonly known as: ZYRTEC  Take 10 mg by mouth once daily.     melatonin 10 mg Cap  Take by mouth.     traMADoL 50 mg tablet  Commonly known as: ULTRAM  Take 50 mg by mouth every 6 (six) hours as needed for Pain.     TUMERIC-GING-OLIVE-OREG-CAPRYL ORAL  Take by mouth.     venlafaxine 75 MG 24 hr capsule  Commonly known as: EFFEXOR-XR  Take 75 mg by mouth once daily.     vitamin D 1000 units Tab  Commonly known as: VITAMIN D3  Take 5,000 Units by mouth.        STOP taking these medications    calcium carbonate-vitamin D3 500 mg-3.125 mcg  (125 unit) per tablet     fluconazole 150 MG Tab  Commonly known as: DIFLUCAN     HYDROcodone-acetaminophen  mg per tablet  Commonly known as: NORCO     pseudoephedrine 30 MG tablet  Commonly known as: SUDAFED        ASK your doctor about these medications    biotin 1 mg Cap  Take 10,000 mcg by mouth.     OSPHENA 60 mg Tab  Generic drug: ospemifene  Take 1 tablet by mouth once daily. With food.     oxybutynin 5 MG Tab  Commonly known as: DITROPAN  Take 5 mg by mouth.            Indwelling Lines/Drains at time of discharge:   Lines/Drains/Airways     None                 Time spent on the discharge of patient: 15 minutes         Bryce Patel MD  Department of Hospital Medicine  [facility] Intensive Care

## 2023-09-06 NOTE — PLAN OF CARE
[facility] - Intensive Care  Discharge Final Note    Primary Care Provider: No, Primary Doctor    Expected Discharge Date: 9/4/2023  Called patient's daughter Krystal with follow up appointment with Dr Madrigal with [facility]. Demonstrated understanding by verbal feedback. States her hand is feeling a little better but the Augmentin is tearing up her stomach. She has started taking pepcid with it & seems to be helping. No other needs at this time.        Final Discharge Note (most recent)       Final Note - 09/06/23 1550          Final Note    Assessment Type Final Discharge Note     Anticipated Discharge Disposition Home or Self Care     What phone number can be called within the next 1-3 days to see how you are doing after discharge? [phone]     Hospital Resources/Appts/Education Provided Appointments scheduled and added to AVS                     Important Message from Medicare  Important Message from Medicare regarding Discharge Appeal Rights: Given to patient/caregiver, Explained to patient/caregiver, Signed/date by patient/caregiver     Date IMM was signed: 08/31/23  Time IMM was signed: 1619    Contact Info       David Madrigal MD   Specialty: Internal Medicine    [address] [place] [id number]   Phone: [phone]       Next Steps: Go on 9/11/2023    Instructions: appointment Monday Sept 11th at 9:45am for hospital follow up

## 2023-09-06 NOTE — HOSPITAL COURSE
Patient admitted for right hand cellulitis following cat bite at home. Given tetanus shot and started on IV abx. MRI obtained which showed no abscess. Discussed case with ortho who did not see patient but stated if no abscess present likely okay to continue with IV abx but low threshold for consultation if hand stops improving. Patient very resistant to any surgical intervention given age and her  had negative experience with anesthesia so she requested conservative management as long as possible. Continued abx, neurovascular checks, hand elevation. Hand improved markedly during time admitted and patient eventually able to be switched to PO abx. ROM returned. Redness resolved. No fever or signs of sepsis. Monitored for 24h on PO abx to ensure continued resolution of hand. Patient medically clear for discharge. Provided discharge instructions and return precautions.    Patient called hospital and asked that I call her and speak to her about antibiotic on 9/5. I personally called patient. Patient stated she was having 8-9 episodes of diarrhea in the 24h since discharge. I advised her that she should consider coming to ED for cdiff testing. She stated that she was going to start taking an antacid with the abx to see if this helps. She stated that if symptoms do not improve in next 24h she will come in and be checked out.

## 2023-12-11 DIAGNOSIS — N95.2 ATROPHIC VAGINITIS: ICD-10-CM

## 2023-12-12 RX ORDER — OSPEMIFENE 60 MG/1
1 TABLET, FILM COATED ORAL
Qty: 90 TABLET | Refills: 3 | Status: SHIPPED | OUTPATIENT
Start: 2023-12-12